# Patient Record
Sex: FEMALE | Race: OTHER | HISPANIC OR LATINO | Employment: UNEMPLOYED | URBAN - METROPOLITAN AREA
[De-identification: names, ages, dates, MRNs, and addresses within clinical notes are randomized per-mention and may not be internally consistent; named-entity substitution may affect disease eponyms.]

---

## 2019-12-08 ENCOUNTER — HOSPITAL ENCOUNTER (EMERGENCY)
Facility: HOSPITAL | Age: 29
Discharge: HOME | End: 2019-12-09
Attending: EMERGENCY MEDICINE | Admitting: EMERGENCY MEDICINE
Payer: COMMERCIAL

## 2019-12-08 ENCOUNTER — APPOINTMENT (EMERGENCY)
Dept: RADIOLOGY | Facility: HOSPITAL | Age: 29
End: 2019-12-08
Payer: COMMERCIAL

## 2019-12-08 VITALS
HEART RATE: 69 BPM | TEMPERATURE: 98.5 F | SYSTOLIC BLOOD PRESSURE: 116 MMHG | BODY MASS INDEX: 29.88 KG/M2 | WEIGHT: 175 LBS | HEIGHT: 64 IN | OXYGEN SATURATION: 100 % | RESPIRATION RATE: 18 BRPM | DIASTOLIC BLOOD PRESSURE: 56 MMHG

## 2019-12-08 DIAGNOSIS — R10.31 BILATERAL LOWER ABDOMINAL CRAMPING: ICD-10-CM

## 2019-12-08 DIAGNOSIS — N93.9 VAGINAL BLEEDING: ICD-10-CM

## 2019-12-08 DIAGNOSIS — O20.0 THREATENED MISCARRIAGE IN EARLY PREGNANCY: Primary | ICD-10-CM

## 2019-12-08 DIAGNOSIS — O23.41 UTI (URINARY TRACT INFECTION) DURING PREGNANCY, FIRST TRIMESTER: ICD-10-CM

## 2019-12-08 DIAGNOSIS — R10.32 BILATERAL LOWER ABDOMINAL CRAMPING: ICD-10-CM

## 2019-12-08 LAB
ABO GROUP BLD: NORMAL
B-HCG SERPL-ACNC: 1353 MIU/ML
BACTERIA UR QL AUTO: ABNORMAL /HPF
BILIRUB UR QL STRIP: NEGATIVE
BLD GP AB SCN SERPL QL: NEGATIVE
CLARITY UR: CLEAR
COLOR UR: YELLOW
EXT PREG TEST URINE: POSITIVE
EXT. CONTROL ED NAV: ABNORMAL
GLUCOSE UR STRIP-MCNC: NEGATIVE MG/DL
HGB UR QL STRIP.AUTO: ABNORMAL
HYALINE CASTS #/AREA URNS LPF: ABNORMAL /LPF
KETONES UR STRIP-MCNC: NEGATIVE MG/DL
LEUKOCYTE ESTERASE UR QL STRIP: ABNORMAL
MUCOUS THREADS UR QL AUTO: ABNORMAL
NITRITE UR QL STRIP: NEGATIVE
NON-SQ EPI CELLS URNS QL MICRO: ABNORMAL /HPF
PH UR STRIP.AUTO: 7 [PH] (ref 4.5–8)
PROT UR STRIP-MCNC: ABNORMAL MG/DL
RBC #/AREA URNS AUTO: ABNORMAL /HPF
RH BLD: POSITIVE
SP GR UR STRIP.AUTO: >=1.03 (ref 1–1.03)
SPECIMEN EXPIRATION DATE: NORMAL
UROBILINOGEN UR QL STRIP.AUTO: 1 E.U./DL
WBC #/AREA URNS AUTO: ABNORMAL /HPF

## 2019-12-08 PROCEDURE — 99284 EMERGENCY DEPT VISIT MOD MDM: CPT | Performed by: EMERGENCY MEDICINE

## 2019-12-08 PROCEDURE — 87086 URINE CULTURE/COLONY COUNT: CPT

## 2019-12-08 PROCEDURE — 81025 URINE PREGNANCY TEST: CPT | Performed by: EMERGENCY MEDICINE

## 2019-12-08 PROCEDURE — 86900 BLOOD TYPING SEROLOGIC ABO: CPT | Performed by: EMERGENCY MEDICINE

## 2019-12-08 PROCEDURE — 86850 RBC ANTIBODY SCREEN: CPT | Performed by: EMERGENCY MEDICINE

## 2019-12-08 PROCEDURE — 99284 EMERGENCY DEPT VISIT MOD MDM: CPT

## 2019-12-08 PROCEDURE — 36415 COLL VENOUS BLD VENIPUNCTURE: CPT | Performed by: EMERGENCY MEDICINE

## 2019-12-08 PROCEDURE — 86901 BLOOD TYPING SEROLOGIC RH(D): CPT | Performed by: EMERGENCY MEDICINE

## 2019-12-08 PROCEDURE — 76801 OB US < 14 WKS SINGLE FETUS: CPT

## 2019-12-08 PROCEDURE — 84702 CHORIONIC GONADOTROPIN TEST: CPT | Performed by: EMERGENCY MEDICINE

## 2019-12-08 PROCEDURE — 81001 URINALYSIS AUTO W/SCOPE: CPT

## 2019-12-09 ENCOUNTER — TELEPHONE (OUTPATIENT)
Dept: OBGYN CLINIC | Facility: CLINIC | Age: 29
End: 2019-12-09

## 2019-12-09 RX ORDER — CEPHALEXIN 500 MG/1
500 CAPSULE ORAL EVERY 12 HOURS SCHEDULED
Qty: 10 CAPSULE | Refills: 0 | Status: SHIPPED | OUTPATIENT
Start: 2019-12-09 | End: 2019-12-14

## 2019-12-09 NOTE — TELEPHONE ENCOUNTER
Pt  called office to make an appointment  She stated she was in the ER yesterday and diagnosed  threatened miscarriage ( less than 14 weeks)  and was to make an appt in 2 days for blood work  She has not established care anywhere yet for the pregnancy  During the conversation she stated she was feeling a lot of pressure and felt like "something was going to come out" and asked if we could see her today  She was instructed to go to the emergency room or urgent care to be seen today  Will follow up after evaluated at urgent care/ER

## 2019-12-09 NOTE — ED ATTENDING ATTESTATION
12/8/2019  I, Mg Quinones MD, saw and evaluated the patient  I have discussed the patient with the resident/non-physician practitioner and agree with the resident's/non-physician practitioner's findings, Plan of Care, and MDM as documented in the resident's/non-physician practitioner's note, except where noted  All available labs and Radiology studies were reviewed  I was present for key portions of any procedure(s) performed by the resident/non-physician practitioner and I was immediately available to provide assistance  At this point I agree with the current assessment done in the Emergency Department  I have conducted an independent evaluation of this patient a history and physical is as follows:    Patient presents with positive pregnancy test and vaginal bleeding intermittently spotting her lower abdominal cramping  Patient has approximately 4 weeks by LMP  Cervical os is closed on pelvic exam bedside ultrasound shows a gestational sac with no definitive IUP or yolk sac      Obtain serum beta HCG quant, type and Rh as patient is nonverbal at time transvaginal ultrasound dispo per formal ultrasound reading    ED Course         Critical Care Time  Procedures

## 2019-12-09 NOTE — ED PROVIDER NOTES
History  Chief Complaint   Patient presents with    Vaginal Bleeding     Pt "I just found out I was pregnant last week and I have been having a lot of cramping and bleeding  " Pt stated that it is dark red blood only noting when wiping     HPI    31yo female [id number] presents for evaluation of vaginal bleeding  Patient says she took home pregnancy and and found out last week she is pregnant, LMP 10/28/2019  Patient says yesterday she noticed bright red blood when she wiped and developed lower abdominal cramping  Patient says today blood is more of a dark brown color but has continued to have lower abdominal pain  Patient notes intermittent nausea  Patient says she has also had a few episodes of diarrhea over last couple days  Patient notes she has a history of a prior ectopic pregnancy  Patient denies any fever, chills, chest pain, shortness of breath, vomiting, hematuria, or dysuria  None       No past medical history on file  No past surgical history on file  No family history on file  I have reviewed and agree with the history as documented  Social History     Tobacco Use    Smoking status: Former Smoker   Substance Use Topics    Alcohol use: Not Currently    Drug use: Not Currently        Review of Systems   Constitutional: Negative for chills, diaphoresis, fatigue and fever  HENT: Negative for congestion, rhinorrhea and sore throat  Eyes: Negative for photophobia and visual disturbance  Respiratory: Negative for cough, chest tightness and shortness of breath  Cardiovascular: Negative for chest pain and palpitations  Gastrointestinal: Positive for abdominal pain  Negative for blood in stool, constipation, diarrhea, nausea and vomiting  Genitourinary: Positive for vaginal bleeding  Negative for dysuria, frequency and hematuria  Musculoskeletal: Negative for back pain, gait problem, myalgias, neck pain and neck stiffness  Skin: Negative for pallor and rash     Neurological: Negative for weakness, light-headedness, numbness and headaches  Hematological: Negative for adenopathy  Does not bruise/bleed easily  All other systems reviewed and are negative  Physical Exam  ED Triage Vitals [12/08/19 2139]   Temperature Pulse Respirations Blood Pressure SpO2   98 5 °F (36 9 °C) 68 18 103/68 100 %      Temp Source Heart Rate Source Patient Position - Orthostatic VS BP Location FiO2 (%)   Oral Monitor Sitting Left arm --      Pain Score       5             Orthostatic Vital Signs  Vitals:    12/08/19 2139 12/08/19 2355   BP: 103/68 116/56   Pulse: 68 69   Patient Position - Orthostatic VS: Sitting Lying       Physical Exam   Constitutional: She is oriented to person, place, and time  She appears well-developed and well-nourished  No distress  Patient is alert and oriented, appears well and nontoxic, in no acute distress   HENT:   Head: Normocephalic and atraumatic  Mouth/Throat: Oropharynx is clear and moist  No oropharyngeal exudate  Eyes: Pupils are equal, round, and reactive to light  Conjunctivae and EOM are normal    Neck: Normal range of motion  Neck supple  Cardiovascular: Normal rate, regular rhythm, normal heart sounds and intact distal pulses  Pulmonary/Chest: Effort normal and breath sounds normal  No respiratory distress  Abdominal: Soft  Bowel sounds are normal  She exhibits no distension  There is tenderness  Lower abdominal tenderness on palpation   Genitourinary: Right adnexum displays no tenderness and no fullness  Left adnexum displays no tenderness and no fullness  There is bleeding in the vagina  No tenderness in the vagina  Genitourinary Comments: Cervix closed   Musculoskeletal: Normal range of motion  Lymphadenopathy:     She has no cervical adenopathy  Neurological: She is alert and oriented to person, place, and time  No sensory deficit  She exhibits normal muscle tone  Skin: Skin is warm and dry  Capillary refill takes less than 2 seconds  No rash noted  She is not diaphoretic  No erythema  No pallor  Psychiatric: She has a normal mood and affect  Her behavior is normal  Judgment and thought content normal    Nursing note and vitals reviewed  ED Medications  Medications - No data to display    Diagnostic Studies  Results Reviewed     Procedure Component Value Units Date/Time    hCG, quantitative [[id number]]  (Abnormal) Collected:  12/08/19 2231    Lab Status:  Final result Specimen:  Blood from Arm, Left Updated:  12/08/19 2327     HCG, Quant 1,353 mIU/mL     Narrative:        Expected Ranges:     Approximate               Approximate HCG  Gestation age          Concentration ( mIU/mL)  _____________          ______________________   Nancy Govea                      HCG values  0 2-1                       5-50  1-2                           2-3                         100-5000  3-4                         500-25154  4-5                         1000-77813  5-6                         [id number]  6-8                         15514-576052  8-12                        67730-490883      Urine Microscopic [[id number]]  (Abnormal) Collected:  12/08/19 2231    Lab Status:  Final result Specimen:  Urine, Clean Catch Updated:  12/08/19 2316     RBC, UA 2-4 /hpf      WBC, UA 2-4 /hpf      Epithelial Cells Innumerable /hpf      Bacteria, UA Moderate /hpf      Hyaline Casts, UA 0-3 /lpf      MUCUS THREADS Moderate    Urine culture [[id number]] Collected:  12/08/19 2231    Lab Status:   In process Specimen:  Urine, Clean Catch Updated:  12/08/19 2238    POCT urinalysis dipstick [[id number]]  (Normal) Resulted:  12/08/19 2232    Lab Status:  Final result Specimen:  Urine Updated:  12/08/19 2232    POCT pregnancy, urine [[id number]]  (Abnormal) Resulted:  12/08/19 2231    Lab Status:  Final result Updated:  12/08/19 2232     EXT PREG TEST UR (Ref: Negative) positive     Control valid    Urine Macroscopic, POC [[id number]]  (Abnormal) Collected:  12/08/19 2231 Lab Status:  Final result Specimen:  Urine Updated:  19 2230     Color, UA Yellow     Clarity, UA Clear     pH, UA 7 0     Leukocytes, UA Trace     Nitrite, UA Negative     Protein,  (2+) mg/dl      Glucose, UA Negative mg/dl      Ketones, UA Negative mg/dl      Urobilinogen, UA 1 0 E U /dl      Bilirubin, UA Negative     Blood, UA Large     Specific Gravity, UA >=1 030    Narrative:       CLINITEK RESULT                 US OB < 14 weeks with transvaginal   Final Result by Mannie Martel MD ( [id number])   Although definitive intrauterine gestation is not identified, hypoechoic cystic structure presumed to represent early gestation is seen, which is partially encased by a hypoechoic collection felt to likely represent a subchorionic hemorrhage, which    circumscribes approximately 50% of the circumference of the cystic structure  Differential remains early IUP, spontaneous  and ectopic pregnancy  Correlate with serial quantitative BHCG  Workstation performed: [id number]               Procedures  Procedures      ED Course  ED Course as of Dec 09 0644   Mon Dec 09, 2019   0024 Discussed lab and imaging results with patient  I instructed patient to have beta quant repeated in 48 hours  I also instructed patient to call OB tomorrow and schedule follow-up for this week  Return precautions discussed  MDM  Number of Diagnoses or Management Options  Bilateral lower abdominal cramping:   Threatened miscarriage in early pregnancy:   UTI (urinary tract infection) during pregnancy, first trimester:   Vaginal bleeding:   Diagnosis management comments: 31yo female presents for evaluation of vaginal bleeding  Patient appears clinically well and is in no acute distress  Patient is hemodynamically stable  Will obtain UA, urine preg, type and screen, beta quant, and formal ultrasound          Disposition  Final diagnoses:   Threatened miscarriage in early pregnancy Vaginal bleeding   Bilateral lower abdominal cramping   UTI (urinary tract infection) during pregnancy, first trimester     Time reflects when diagnosis was documented in both MDM as applicable and the Disposition within this note     Time User Action Codes Description Comment    12/9/2019 12:21 AM Remus Ohs Add [O20 0] Threatened miscarriage in early pregnancy     12/9/2019 12:21 AM Remus Ohs Add [N93 9] Vaginal bleeding     12/9/2019 12:21 AM Remus Ohs Add [R10 31,  R10 32] Bilateral lower abdominal cramping     12/9/2019 12:23 AM Remus Ohs Add [O23 41] UTI (urinary tract infection) during pregnancy, first trimester       ED Disposition     ED Disposition Condition Date/Time Comment    Discharge Stable Mon Dec 9, 2019 12:21 AM Sancho Dennison discharge to home/self care  Follow-up Information     Follow up With Specialties Details Why [address] [facility] OB GYN Obstetrics and Gynecology Schedule an appointment as soon as possible for a visit  To establish primary care [address] [place] [id number] [phone]          Discharge Medication List as of 12/9/2019 12:23 AM      START taking these medications    Details   cephalexin (KEFLEX) 500 mg capsule Take 1 capsule (500 mg total) by mouth every 12 (twelve) hours for 5 days, Starting Mon 12/9/2019, Until Sat 12/14/2019, Print           Outpatient Discharge Orders   hCG, quantitative   Standing Status: Future Standing Exp  Date: 12/09/20       ED Provider  Attending physically available and evaluated Sancho Dennison I managed the patient along with the ED Attending      Electronically Signed by         Keith Jimenez MD  12/09/19 1429

## 2019-12-10 LAB — BACTERIA UR CULT: NORMAL

## 2019-12-11 ENCOUNTER — APPOINTMENT (OUTPATIENT)
Dept: LAB | Facility: HOSPITAL | Age: 29
End: 2019-12-11
Attending: EMERGENCY MEDICINE
Payer: COMMERCIAL

## 2019-12-11 DIAGNOSIS — O20.0 THREATENED MISCARRIAGE IN EARLY PREGNANCY: ICD-10-CM

## 2019-12-11 LAB — B-HCG SERPL-ACNC: 3847 MIU/ML

## 2019-12-11 PROCEDURE — 36415 COLL VENOUS BLD VENIPUNCTURE: CPT

## 2019-12-11 PROCEDURE — 84702 CHORIONIC GONADOTROPIN TEST: CPT

## 2020-07-09 ENCOUNTER — EVALUATION (OUTPATIENT)
Dept: PHYSICAL THERAPY | Facility: MEDICAL CENTER | Age: 30
End: 2020-07-09
Payer: COMMERCIAL

## 2020-07-09 ENCOUNTER — TRANSCRIBE ORDERS (OUTPATIENT)
Dept: PHYSICAL THERAPY | Facility: MEDICAL CENTER | Age: 30
End: 2020-07-09

## 2020-07-09 DIAGNOSIS — G44.86 CERVICOGENIC HEADACHE: ICD-10-CM

## 2020-07-09 DIAGNOSIS — G89.29 CHRONIC NECK PAIN: ICD-10-CM

## 2020-07-09 DIAGNOSIS — M54.2 CHRONIC NECK PAIN: ICD-10-CM

## 2020-07-09 DIAGNOSIS — S13.4XXA CHRONIC WHIPLASH INJURY, INITIAL ENCOUNTER: ICD-10-CM

## 2020-07-09 DIAGNOSIS — M54.2 CHRONIC NECK PAIN: Primary | ICD-10-CM

## 2020-07-09 DIAGNOSIS — G89.29 CHRONIC NECK PAIN: Primary | ICD-10-CM

## 2020-07-09 DIAGNOSIS — S13.4XXA CHRONIC WHIPLASH INJURY, INITIAL ENCOUNTER: Primary | ICD-10-CM

## 2020-07-09 PROCEDURE — 97162 PT EVAL MOD COMPLEX 30 MIN: CPT | Performed by: PHYSICAL THERAPIST

## 2020-07-09 PROCEDURE — 97010 HOT OR COLD PACKS THERAPY: CPT | Performed by: PHYSICAL THERAPIST

## 2020-07-09 PROCEDURE — 97140 MANUAL THERAPY 1/> REGIONS: CPT | Performed by: PHYSICAL THERAPIST
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
## 2020-07-15 ENCOUNTER — OFFICE VISIT (OUTPATIENT)
Dept: PHYSICAL THERAPY | Facility: MEDICAL CENTER | Age: 30
End: 2020-07-15
Payer: COMMERCIAL

## 2020-07-15 DIAGNOSIS — G44.86 CERVICOGENIC HEADACHE: ICD-10-CM

## 2020-07-15 DIAGNOSIS — M54.2 CHRONIC NECK PAIN: Primary | ICD-10-CM

## 2020-07-15 DIAGNOSIS — G89.29 CHRONIC NECK PAIN: Primary | ICD-10-CM

## 2020-07-15 DIAGNOSIS — S13.4XXA CHRONIC WHIPLASH INJURY, INITIAL ENCOUNTER: ICD-10-CM

## 2020-07-15 PROCEDURE — 97110 THERAPEUTIC EXERCISES: CPT | Performed by: PHYSICAL THERAPIST

## 2020-07-15 PROCEDURE — 97140 MANUAL THERAPY 1/> REGIONS: CPT | Performed by: PHYSICAL THERAPIST
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
## 2020-07-23 ENCOUNTER — OFFICE VISIT (OUTPATIENT)
Dept: PHYSICAL THERAPY | Facility: MEDICAL CENTER | Age: 30
End: 2020-07-23
Payer: COMMERCIAL

## 2020-07-23 DIAGNOSIS — M54.2 CHRONIC NECK PAIN: Primary | ICD-10-CM

## 2020-07-23 DIAGNOSIS — G89.29 CHRONIC NECK PAIN: Primary | ICD-10-CM

## 2020-07-23 DIAGNOSIS — G44.86 CERVICOGENIC HEADACHE: ICD-10-CM

## 2020-07-23 DIAGNOSIS — S13.4XXA CHRONIC WHIPLASH INJURY, INITIAL ENCOUNTER: ICD-10-CM

## 2020-07-23 PROCEDURE — 97110 THERAPEUTIC EXERCISES: CPT | Performed by: PHYSICAL THERAPIST

## 2020-07-23 PROCEDURE — 97140 MANUAL THERAPY 1/> REGIONS: CPT | Performed by: PHYSICAL THERAPIST
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
## 2020-07-28 ENCOUNTER — OFFICE VISIT (OUTPATIENT)
Dept: PHYSICAL THERAPY | Facility: MEDICAL CENTER | Age: 30
End: 2020-07-28
Payer: COMMERCIAL

## 2020-07-28 DIAGNOSIS — M54.2 CHRONIC NECK PAIN: Primary | ICD-10-CM

## 2020-07-28 DIAGNOSIS — G89.29 CHRONIC NECK PAIN: Primary | ICD-10-CM

## 2020-07-28 DIAGNOSIS — G44.86 CERVICOGENIC HEADACHE: ICD-10-CM

## 2020-07-28 DIAGNOSIS — S13.4XXA CHRONIC WHIPLASH INJURY, INITIAL ENCOUNTER: ICD-10-CM

## 2020-07-28 PROCEDURE — 97140 MANUAL THERAPY 1/> REGIONS: CPT | Performed by: PHYSICAL THERAPIST

## 2020-07-28 PROCEDURE — 97110 THERAPEUTIC EXERCISES: CPT | Performed by: PHYSICAL THERAPIST
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
## 2020-08-04 ENCOUNTER — OFFICE VISIT (OUTPATIENT)
Dept: PHYSICAL THERAPY | Facility: MEDICAL CENTER | Age: 30
End: 2020-08-04
Payer: COMMERCIAL

## 2020-08-04 DIAGNOSIS — M54.2 CHRONIC NECK PAIN: Primary | ICD-10-CM

## 2020-08-04 DIAGNOSIS — G44.86 CERVICOGENIC HEADACHE: ICD-10-CM

## 2020-08-04 DIAGNOSIS — G89.29 CHRONIC NECK PAIN: Primary | ICD-10-CM

## 2020-08-04 DIAGNOSIS — S13.4XXA CHRONIC WHIPLASH INJURY, INITIAL ENCOUNTER: ICD-10-CM

## 2020-08-04 PROCEDURE — 97110 THERAPEUTIC EXERCISES: CPT | Performed by: PHYSICAL THERAPIST

## 2020-08-04 PROCEDURE — 97140 MANUAL THERAPY 1/> REGIONS: CPT | Performed by: PHYSICAL THERAPIST
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
## 2020-08-06 ENCOUNTER — OFFICE VISIT (OUTPATIENT)
Dept: PHYSICAL THERAPY | Facility: MEDICAL CENTER | Age: 30
End: 2020-08-06
Payer: COMMERCIAL

## 2020-08-06 DIAGNOSIS — G44.86 CERVICOGENIC HEADACHE: ICD-10-CM

## 2020-08-06 DIAGNOSIS — G89.29 CHRONIC NECK PAIN: Primary | ICD-10-CM

## 2020-08-06 DIAGNOSIS — M54.2 CHRONIC NECK PAIN: Primary | ICD-10-CM

## 2020-08-06 DIAGNOSIS — S13.4XXA CHRONIC WHIPLASH INJURY, INITIAL ENCOUNTER: ICD-10-CM

## 2020-08-06 PROCEDURE — 97140 MANUAL THERAPY 1/> REGIONS: CPT | Performed by: PHYSICAL THERAPIST

## 2020-08-06 PROCEDURE — 97110 THERAPEUTIC EXERCISES: CPT | Performed by: PHYSICAL THERAPIST
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
## 2020-08-11 ENCOUNTER — OFFICE VISIT (OUTPATIENT)
Dept: PHYSICAL THERAPY | Facility: MEDICAL CENTER | Age: 30
End: 2020-08-11
Payer: COMMERCIAL

## 2020-08-11 DIAGNOSIS — M54.6 THORACIC SPINE PAIN: ICD-10-CM

## 2020-08-11 DIAGNOSIS — G89.29 CHRONIC MIDLINE LOW BACK PAIN WITHOUT SCIATICA: Primary | ICD-10-CM

## 2020-08-11 DIAGNOSIS — M54.50 CHRONIC MIDLINE LOW BACK PAIN WITHOUT SCIATICA: Primary | ICD-10-CM

## 2020-08-11 PROCEDURE — 97110 THERAPEUTIC EXERCISES: CPT | Performed by: PHYSICAL THERAPIST

## 2020-08-11 NOTE — PROGRESS NOTES
Daily Note     Today's date: 2020  Patient name: Megan Eugene  : 1990  MRN: [id number]  Referring provider: Yazmin Fritz MD  Dx:   Encounter Diagnosis     ICD-10-CM    1  Chronic midline low back pain without sciatica  M54 5 Ambulatory referral to Pain Management    G89 29    2  Thoracic spine pain  M54 6 Ambulatory referral to Pain Management                  Subjective: Patient arrived at clinic stating that her pain was much worse and she doesn't know why her pain has been this bad  Patient notes that for the past several days her pain is nearly unbearable and she is having severe discomfort with sleeping and waking  Objective: See treatment diary below   Pain today 8/10 at rest      Assessment: Patient in general has been doing much better however she continues to have flares of pain that are so debilitating she can't function or sleep  Patient reports that she is doing nothing different and if anything she has been more diligent with being careful with her neck and back and trying to take care of it  Symptoms do not seem to be following a movement pattern  Due to these findings a referral has been placed to pain and spine and patient again was urged to find a PCP  She was given the number for [facility]  She will be placed on hold for the time being until she is seen by pain and spine  Plan: Continue per plan of care        Precautions: none      Manuals 2020              C2-4 UPA righ             C3-4 rotational mob             STM                          Neuro Re-Ed                                                                                                        Ther Ex             Chin tucks 15            PROM cervical rotation and side bend [id number]            Shoulder abduction against wall 10x2            B Should ER against wall 10x            pec stretch             Shoulder extension 10x            scap retraction 10x                         Ther Activity Gait Training                                       Modalities

## 2020-08-13 ENCOUNTER — APPOINTMENT (OUTPATIENT)
Dept: PHYSICAL THERAPY | Facility: MEDICAL CENTER | Age: 30
End: 2020-08-13
Payer: COMMERCIAL

## 2020-09-04 ENCOUNTER — CONSULT (OUTPATIENT)
Dept: PAIN MEDICINE | Facility: MEDICAL CENTER | Age: 30
End: 2020-09-04
Payer: COMMERCIAL

## 2020-09-04 VITALS
DIASTOLIC BLOOD PRESSURE: 71 MMHG | SYSTOLIC BLOOD PRESSURE: 112 MMHG | HEIGHT: 64 IN | RESPIRATION RATE: 14 BRPM | TEMPERATURE: 97.9 F | BODY MASS INDEX: 28.68 KG/M2 | HEART RATE: 73 BPM | WEIGHT: 168 LBS

## 2020-09-04 DIAGNOSIS — M54.6 THORACIC SPINE PAIN: ICD-10-CM

## 2020-09-04 DIAGNOSIS — M79.18 MYOFASCIAL PAIN SYNDROME: ICD-10-CM

## 2020-09-04 DIAGNOSIS — G89.29 CHRONIC MIDLINE LOW BACK PAIN WITHOUT SCIATICA: ICD-10-CM
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
## 2020-09-04 DIAGNOSIS — G44.86 CERVICOGENIC HEADACHE: ICD-10-CM

## 2020-09-04 DIAGNOSIS — M54.50 CHRONIC MIDLINE LOW BACK PAIN WITHOUT SCIATICA: ICD-10-CM

## 2020-09-04 DIAGNOSIS — M54.81 OCCIPITAL NEURALGIA OF LEFT SIDE: Primary | ICD-10-CM

## 2020-09-04 PROCEDURE — 99244 OFF/OP CNSLTJ NEW/EST MOD 40: CPT | Performed by: PHYSICAL MEDICINE & REHABILITATION

## 2020-09-04 RX ORDER — GABAPENTIN 300 MG/1
300 CAPSULE ORAL
Qty: 30 CAPSULE | Refills: 1 | Status: SHIPPED | OUTPATIENT
Start: 2020-09-04 | End: 2020-12-16

## 2020-09-04 NOTE — PATIENT INSTRUCTIONS
Neck Exercises   WHAT YOU NEED TO KNOW:   Why is it important to do neck exercises? Neck exercises help reduce neck pain, and improve neck movement and strength  Neck exercises also help prevent long-term neck problems  What do I need to know about neck exercises? · Do the exercises every day,  or as often as directed by your healthcare provider  · Move slowly, gently, and smoothly  Avoid fast or jerky motions  · Stand and sit the way your healthcare provider shows you  Good posture may reduce your neck pain  Check your posture often, even when you are not doing your neck exercises  How do I perform neck exercises safely? · Exercise position:  You may sit or stand while you do neck exercises  Face forward  Your shoulders should be straight and relaxed, with a good posture  · Head tilts, forward and back:  Gently bow your head and try to touch your chin to your chest  Your healthcare provider may tell you to push on the back of your neck to help bow your head  Raise your chin back to the starting position  Tilt your head back as far as possible so you are looking up at the ceiling  Your healthcare provider may tell you to lift your chin to help tilt your head back  Return your head to the starting position  · Head tilts, side to side:  Tilt your head, bringing your ear toward your shoulder  Then tilt your head toward the other shoulder  · Head turns:  Turn your head to look over your shoulder  Tilt your chin down and try to touch it to your shoulder  Do not raise your shoulder to your chin  Face forward again  Do the same on the other side  · Head rolls:  Slowly bring your chin toward your chest  Next, roll your head to the right  Your ear should be positioned over your shoulder  Hold this position for 5 seconds  Roll your head back toward your chest and to the left into the same position  Hold for 5 seconds   Gently roll your head back and around in a clockwise [place] 3 times  Next, move your head in the reverse direction (counterclockwise) in a [place] 3 times  Do not shrug your shoulders upwards while you do this exercise  When should I contact my healthcare provider? · Your pain does not get better, or gets worse  · You have questions or concerns about your condition, care, or exercise program   CARE AGREEMENT:   You have the right to help plan your care  Learn about your health condition and how it may be treated  Discuss treatment options with your caregivers to decide what care you want to receive  You always have the right to refuse treatment  The above information is an  only  It is not intended as medical advice for individual conditions or treatments  Talk to your doctor, nurse or pharmacist before following any medical regimen to see if it is safe and effective for you  © 2017 [employer] Information is for End User's use only and may not be sold, redistributed or otherwise used for commercial purposes  All illustrations and images included in CareNotes® are the copyrighted property of A [employer]  or Andrzej Anderson
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
## 2020-09-09 ENCOUNTER — OFFICE VISIT (OUTPATIENT)
Dept: PAIN MEDICINE | Facility: MEDICAL CENTER | Age: 30
End: 2020-09-09
Payer: COMMERCIAL

## 2020-09-09 VITALS
HEIGHT: 64 IN | DIASTOLIC BLOOD PRESSURE: 74 MMHG | WEIGHT: 162 LBS | HEART RATE: 62 BPM | BODY MASS INDEX: 27.66 KG/M2 | TEMPERATURE: 98.9 F | SYSTOLIC BLOOD PRESSURE: 110 MMHG | RESPIRATION RATE: 16 BRPM

## 2020-09-09 DIAGNOSIS — G89.4 CHRONIC PAIN SYNDROME: ICD-10-CM

## 2020-09-09 DIAGNOSIS — M54.2 NECK PAIN: ICD-10-CM

## 2020-09-09 DIAGNOSIS — M79.18 MYOFASCIAL PAIN SYNDROME: ICD-10-CM

## 2020-09-09 PROCEDURE — 20552 NJX 1/MLT TRIGGER POINT 1/2: CPT | Performed by: PHYSICAL MEDICINE & REHABILITATION

## 2020-09-09 PROCEDURE — 99214 OFFICE O/P EST MOD 30 MIN: CPT | Performed by: PHYSICAL MEDICINE & REHABILITATION

## 2020-09-09 RX ORDER — METHOCARBAMOL 500 MG/1
500 TABLET, FILM COATED ORAL 3 TIMES DAILY PRN
Qty: 90 TABLET | Refills: 0 | Status: SHIPPED | OUTPATIENT
Start: 2020-09-09 | End: 2020-12-16

## 2020-09-09 RX ORDER — LIDOCAINE HYDROCHLORIDE 10 MG/ML
2 INJECTION, SOLUTION EPIDURAL; INFILTRATION; INTRACAUDAL; PERINEURAL ONCE
Status: COMPLETED | OUTPATIENT
Start: 2020-09-09 | End: 2020-09-09

## 2020-09-09 RX ADMIN — LIDOCAINE HYDROCHLORIDE 2 ML: 10 INJECTION, SOLUTION EPIDURAL; INFILTRATION; INTRACAUDAL; PERINEURAL at 10:50
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
## 2020-10-01 ENCOUNTER — TELEPHONE (OUTPATIENT)
Dept: PAIN MEDICINE | Facility: CLINIC | Age: 30
End: 2020-10-01

## 2020-10-16 ENCOUNTER — TELEPHONE (OUTPATIENT)
Dept: PAIN MEDICINE | Facility: MEDICAL CENTER | Age: 30
End: 2020-10-16

## 2020-10-27 ENCOUNTER — TELEPHONE (OUTPATIENT)
Dept: PAIN MEDICINE | Facility: MEDICAL CENTER | Age: 30
End: 2020-10-27

## 2020-10-28 DIAGNOSIS — M54.2 NECK PAIN: ICD-10-CM

## 2020-10-28 DIAGNOSIS — M54.81 BILATERAL OCCIPITAL NEURALGIA: ICD-10-CM

## 2020-10-28 DIAGNOSIS — M47.812 CERVICAL SPONDYLOSIS: Primary | ICD-10-CM

## 2020-10-28 RX ORDER — DULOXETIN HYDROCHLORIDE 30 MG/1
30 CAPSULE, DELAYED RELEASE ORAL DAILY
Qty: 30 CAPSULE | Refills: 1 | Status: SHIPPED | OUTPATIENT
Start: 2020-10-28 | End: 2020-12-16 | Stop reason: SDUPTHER

## 2020-11-19 ENCOUNTER — PROCEDURE VISIT (OUTPATIENT)
Dept: PAIN MEDICINE | Facility: MEDICAL CENTER | Age: 30
End: 2020-11-19
Payer: COMMERCIAL

## 2020-11-19 VITALS — TEMPERATURE: 97.7 F

## 2020-11-19 DIAGNOSIS — M54.81 OCCIPITAL NEURALGIA OF LEFT SIDE: Primary | ICD-10-CM

## 2020-11-19 PROCEDURE — 76942 ECHO GUIDE FOR BIOPSY: CPT | Performed by: PHYSICAL MEDICINE & REHABILITATION

## 2020-11-19 PROCEDURE — 64405 NJX AA&/STRD GR OCPL NRV: CPT | Performed by: PHYSICAL MEDICINE & REHABILITATION

## 2020-11-19 RX ORDER — METHYLPREDNISOLONE ACETATE 40 MG/ML
40 INJECTION, SUSPENSION INTRA-ARTICULAR; INTRALESIONAL; INTRAMUSCULAR; SOFT TISSUE ONCE
Status: COMPLETED | OUTPATIENT
Start: 2020-11-19 | End: 2020-11-19

## 2020-11-19 RX ORDER — BUPIVACAINE HYDROCHLORIDE 2.5 MG/ML
10 INJECTION, SOLUTION EPIDURAL; INFILTRATION; INTRACAUDAL ONCE
Status: COMPLETED | OUTPATIENT
Start: 2020-11-19 | End: 2020-11-19

## 2020-11-19 RX ADMIN — METHYLPREDNISOLONE ACETATE 40 MG: 40 INJECTION, SUSPENSION INTRA-ARTICULAR; INTRALESIONAL; INTRAMUSCULAR; SOFT TISSUE at 11:13

## 2020-11-19 RX ADMIN — BUPIVACAINE HYDROCHLORIDE 10 ML: 2.5 INJECTION, SOLUTION EPIDURAL; INFILTRATION; INTRACAUDAL at 11:13

## 2020-11-27 ENCOUNTER — TELEPHONE (OUTPATIENT)
Dept: PAIN MEDICINE | Facility: CLINIC | Age: 30
End: 2020-11-27

## 2020-12-13 ENCOUNTER — TELEPHONE (OUTPATIENT)
Dept: OTHER | Facility: OTHER | Age: 30
End: 2020-12-13

## 2020-12-16 ENCOUNTER — OFFICE VISIT (OUTPATIENT)
Dept: PAIN MEDICINE | Facility: MEDICAL CENTER | Age: 30
End: 2020-12-16
Payer: COMMERCIAL

## 2020-12-16 VITALS
BODY MASS INDEX: 29.02 KG/M2 | HEIGHT: 64 IN | SYSTOLIC BLOOD PRESSURE: 105 MMHG | HEART RATE: 58 BPM | TEMPERATURE: 96.8 F | DIASTOLIC BLOOD PRESSURE: 66 MMHG | WEIGHT: 170 LBS

## 2020-12-16 DIAGNOSIS — G89.4 CHRONIC PAIN SYNDROME: ICD-10-CM

## 2020-12-16 DIAGNOSIS — M54.2 NECK PAIN: ICD-10-CM

## 2020-12-16 DIAGNOSIS — M54.81 BILATERAL OCCIPITAL NEURALGIA: ICD-10-CM

## 2020-12-16 DIAGNOSIS — M79.18 MYOFASCIAL PAIN SYNDROME: ICD-10-CM

## 2020-12-16 DIAGNOSIS — M47.812 CERVICAL SPONDYLOSIS: ICD-10-CM

## 2020-12-16 PROCEDURE — 99214 OFFICE O/P EST MOD 30 MIN: CPT | Performed by: NURSE PRACTITIONER

## 2020-12-16 RX ORDER — DULOXETINE 40 MG/1
40 CAPSULE, DELAYED RELEASE ORAL DAILY
Qty: 30 CAPSULE | Refills: 2 | Status: SHIPPED | OUTPATIENT
Start: 2020-12-16 | End: 2020-12-17

## 2020-12-16 RX ORDER — TIZANIDINE 2 MG/1
2 TABLET ORAL 2 TIMES DAILY
Qty: 60 TABLET | Refills: 2 | Status: SHIPPED | OUTPATIENT
Start: 2020-12-16 | End: 2021-03-25

## 2020-12-17 ENCOUNTER — TELEPHONE (OUTPATIENT)
Dept: PAIN MEDICINE | Facility: CLINIC | Age: 30
End: 2020-12-17

## 2020-12-17 DIAGNOSIS — M79.18 MYOFASCIAL PAIN SYNDROME: Primary | ICD-10-CM

## 2020-12-17 DIAGNOSIS — M54.81 BILATERAL OCCIPITAL NEURALGIA: ICD-10-CM

## 2020-12-17 DIAGNOSIS — M54.2 NECK PAIN: ICD-10-CM

## 2020-12-17 DIAGNOSIS — M47.812 CERVICAL SPONDYLOSIS: ICD-10-CM

## 2020-12-17 RX ORDER — DULOXETIN HYDROCHLORIDE 20 MG/1
CAPSULE, DELAYED RELEASE ORAL
Qty: 60 CAPSULE | Refills: 1 | Status: SHIPPED | OUTPATIENT
Start: 2020-12-17 | End: 2021-03-25

## 2020-12-28 ENCOUNTER — TELEPHONE (OUTPATIENT)
Dept: PAIN MEDICINE | Facility: CLINIC | Age: 30
End: 2020-12-28

## 2021-01-28 ENCOUNTER — OFFICE VISIT (OUTPATIENT)
Dept: PAIN MEDICINE | Facility: MEDICAL CENTER | Age: 31
End: 2021-01-28
Payer: COMMERCIAL

## 2021-01-28 VITALS — TEMPERATURE: 97.4 F | WEIGHT: 170 LBS | BODY MASS INDEX: 29.02 KG/M2 | HEIGHT: 64 IN

## 2021-01-28 DIAGNOSIS — M54.16 LUMBAR RADICULOPATHY: Primary | ICD-10-CM

## 2021-01-28 PROCEDURE — 20552 NJX 1/MLT TRIGGER POINT 1/2: CPT | Performed by: PHYSICAL MEDICINE & REHABILITATION

## 2021-01-28 PROCEDURE — 99214 OFFICE O/P EST MOD 30 MIN: CPT | Performed by: PHYSICAL MEDICINE & REHABILITATION

## 2021-01-28 NOTE — PATIENT INSTRUCTIONS
Trigger Point Pain   WHAT YOU NEED TO KNOW:   A trigger point is a tight, tender lump in your muscle  Trigger points may form after muscle injury, overuse, stress, or tension  Muscle stress may be from poor posture or an awkward sleep position  Emotional stress can make you tense certain muscles, such as in your neck  DISCHARGE INSTRUCTIONS:   Call your doctor or pain specialist if:   · You have new or worsening pain around the trigger point  · You have questions or concerns about your condition or care  Medicines: You may need any of the following:  · NSAIDs , such as ibuprofen, help decrease swelling, pain, and fever  NSAIDs can cause stomach bleeding or kidney problems in certain people  If you take blood thinner medicine, always ask your healthcare provider if NSAIDs are safe for you  Always read the medicine label and follow directions  · Prescription pain medicine  may be given  Ask your healthcare provider how to take this medicine safely  Some prescription pain medicines contain acetaminophen  Do not take other medicines that contain acetaminophen without talking to your healthcare provider  Too much acetaminophen may cause liver damage  Prescription pain medicine may cause constipation  Ask your healthcare provider how to prevent or treat constipation  · Medicine  may be given to help your muscles relax  · Take your medicine as directed  Contact your healthcare provider if you think your medicine is not helping or if you have side effects  Tell him or her if you are allergic to any medicine  Keep a list of the medicines, vitamins, and herbs you take  Include the amounts, and when and why you take them  Bring the list or the pill bottles to follow-up visits  Carry your medicine list with you in case of an emergency  Manage trigger point pain:   · Do regular stretches of the trigger point muscle  Place gentle pressure on the trigger point, and then stretch the muscle   Ask your healthcare provider for more information about how to stretch and apply pressure  · Apply heat to trigger point sites  Heat can help relax muscles and relieve trigger point pain  Use a heat pack or a heating pad set on low  Apply heat for 15 minutes every hour, or as directed  Follow up with your doctor or pain specialist as directed:  Write down your questions so you remember to ask them during your visits  © Copyright [address] Information is for End User's use only and may not be sold, redistributed or otherwise used for commercial purposes  All illustrations and images included in CareNotes® are the copyrighted property of [employer]  or [address] [place] [address]  The above information is an  only  It is not intended as medical advice for individual conditions or treatments  Talk to your doctor, nurse or pharmacist before following any medical regimen to see if it is safe and effective for you

## 2021-01-28 NOTE — PROGRESS NOTES
Assessment:  1  Lumbar radiculopathy        Plan:  Indication:  Muscular pain  Preprocedure diagnosis:  1  Myofascial pain syndrome  Postprocedure diagnosis:  1  Myofascial pain syndrome    Procedure: Bilateral trap muscle trigger point injection(s)  After discussing the risks, benefits, and alternatives to the procedure, the patient expressed understanding and wished to proceed  The patient was brought to the procedure suite and placed in the  seated position  A procedural pause was conducted to verify:  correct patient identity, procedure to be performed and as applicable, correct side and site, correct patient position, and availability of implants, special equipment or special requirements  A simple surgical tray was used  Prior to the procedure, the  Bilateral traps were examined and marked  Following this, the area was prepared with alcohol pads then re-examined  Thereafter, a 1 5 inch 25 gauge needle was inserted, negative aspiration for blood, and then a mixture of  4 mL of 1% lidocaine with 1 mL of 40 milligrams/milliliter Depo-Medrol was injected into the  Targeted trigger points  Following the injection, the needle was withdrawn  The patient tolerated the procedure well and there were no apparent complications  No significant bleeding post-injection, bandaids applied  After an appropriate amount of observation, the patient was dismissed from the clinic in good condition under their own power  History of Present Illness: The patient is a 31 y o  female presents to [facility] for follow-up and re-evaluation regarding neck, bilateral midback pain  Currently reporting 8/10 pain that is described as a constant throbbing, shooting, pins and needles, sharp, dull and aching sensation that is most present in the morning in the evening  Presents today for follow-up, re-evaluation and trigger point injections to the bilateral traps      I have personally reviewed and/or updated the patient's past medical history, past surgical history, family history, social history, current medications, allergies, and vital signs today  Review of Systems:    Review of Systems   Respiratory: Negative for shortness of breath  Cardiovascular: Negative for chest pain  Gastrointestinal: Negative for constipation, diarrhea, nausea and vomiting  Musculoskeletal: Negative for arthralgias, gait problem, joint swelling and myalgias  Skin: Negative for rash  Neurological: Negative for dizziness, seizures and weakness  All other systems reviewed and are negative  Past Medical History:   Diagnosis Date    Anxiety     Chronic pain disorder     Neck pain     Osteoarthritis        History reviewed  No pertinent surgical history  Family History   Problem Relation Age of Onset    No Known Problems Mother     No Known Problems Father        Social History     Occupational History    Not on file   Tobacco Use    Smoking status: Former Smoker    Smokeless tobacco: Never Used   Substance and Sexual Activity    Alcohol use: Not Currently    Drug use: Not Currently    Sexual activity: Yes     Partners: Male         Current Outpatient Medications:     DULoxetine (CYMBALTA) 20 mg capsule, Take 2 caps PO daily, Disp: 60 capsule, Rfl: 1    tiZANidine (ZANAFLEX) 2 mg tablet, Take 1 tablet (2 mg total) by mouth 2 (two) times a day, Disp: 60 tablet, Rfl: 2    No Known Allergies    Physical Exam:    Temp (!) 97 4 °F (36 3 °C)   Ht 5' 4" (1 626 m)   Wt 77 1 kg (170 lb)   BMI 29 18 kg/m²     Constitutional:normal, well developed, well nourished, alert, in no distress and non-toxic and no overt pain behavior   and underweight  Eyes:anicteric  HEENT:grossly intact  Neck:supple, symmetric, trachea midline and no masses   Pulmonary:even and unlabored  Cardiovascular:No edema or pitting edema present  Skin:Normal without rashes or lesions and well hydrated  Psychiatric:Mood and affect appropriate  Neurologic:Cranial Nerves II-XII grossly intact  Musculoskeletal:normal  Gait, tenderness to palpation bilateral traps, decreased active and passive range of motion with cervical flexion extension limited by pain      Imaging  MRI lumbar spine without contrast    (Results Pending)         Orders Placed This Encounter   Procedures    MRI lumbar spine without contrast

## 2021-01-29 RX ORDER — LIDOCAINE HYDROCHLORIDE 10 MG/ML
5 INJECTION, SOLUTION INFILTRATION; PERINEURAL ONCE
Status: CANCELLED | OUTPATIENT
Start: 2021-01-29 | End: 2021-01-29

## 2021-01-29 RX ORDER — METHYLPREDNISOLONE ACETATE 40 MG/ML
40 INJECTION, SUSPENSION INTRA-ARTICULAR; INTRALESIONAL; INTRAMUSCULAR; SOFT TISSUE ONCE
Status: CANCELLED | OUTPATIENT
Start: 2021-01-29 | End: 2021-01-29

## 2021-02-02 DIAGNOSIS — M54.40 BILATERAL LOW BACK PAIN WITH SCIATICA, SCIATICA LATERALITY UNSPECIFIED, UNSPECIFIED CHRONICITY: ICD-10-CM

## 2021-02-02 DIAGNOSIS — M54.40 LOW BACK PAIN WITH SCIATICA, SCIATICA LATERALITY UNSPECIFIED, UNSPECIFIED BACK PAIN LATERALITY, UNSPECIFIED CHRONICITY: Primary | ICD-10-CM

## 2021-02-12 NOTE — PROGRESS NOTES
Thank you for the catch   The visit was for a trigger point injection and should be but is a [id number]   Thank you

## 2021-02-18 ENCOUNTER — EVALUATION (OUTPATIENT)
Dept: PHYSICAL THERAPY | Facility: MEDICAL CENTER | Age: 31
End: 2021-02-18
Payer: COMMERCIAL

## 2021-02-18 DIAGNOSIS — M54.50 LUMBAR PAIN: Primary | ICD-10-CM

## 2021-02-18 PROCEDURE — 97112 NEUROMUSCULAR REEDUCATION: CPT | Performed by: PHYSICAL MEDICINE & REHABILITATION

## 2021-02-18 PROCEDURE — 97161 PT EVAL LOW COMPLEX 20 MIN: CPT | Performed by: PHYSICAL MEDICINE & REHABILITATION
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
## 2021-02-23 ENCOUNTER — APPOINTMENT (OUTPATIENT)
Dept: PHYSICAL THERAPY | Facility: MEDICAL CENTER | Age: 31
End: 2021-02-23
Payer: COMMERCIAL

## 2021-02-25 ENCOUNTER — OFFICE VISIT (OUTPATIENT)
Dept: PHYSICAL THERAPY | Facility: MEDICAL CENTER | Age: 31
End: 2021-02-25
Payer: COMMERCIAL

## 2021-02-25 ENCOUNTER — PROCEDURE VISIT (OUTPATIENT)
Dept: PAIN MEDICINE | Facility: MEDICAL CENTER | Age: 31
End: 2021-02-25
Payer: COMMERCIAL

## 2021-02-25 DIAGNOSIS — M54.50 LUMBAR PAIN: Primary | ICD-10-CM

## 2021-02-25 DIAGNOSIS — M79.18 MYOFASCIAL PAIN SYNDROME: Primary | ICD-10-CM

## 2021-02-25 PROCEDURE — 76942 ECHO GUIDE FOR BIOPSY: CPT | Performed by: PHYSICAL MEDICINE & REHABILITATION

## 2021-02-25 PROCEDURE — 20552 NJX 1/MLT TRIGGER POINT 1/2: CPT | Performed by: PHYSICAL MEDICINE & REHABILITATION

## 2021-02-25 PROCEDURE — 97010 HOT OR COLD PACKS THERAPY: CPT | Performed by: PHYSICAL THERAPIST

## 2021-02-25 PROCEDURE — 97112 NEUROMUSCULAR REEDUCATION: CPT | Performed by: PHYSICAL THERAPIST

## 2021-02-25 RX ORDER — METHYLPREDNISOLONE ACETATE 40 MG/ML
40 INJECTION, SUSPENSION INTRA-ARTICULAR; INTRALESIONAL; INTRAMUSCULAR; SOFT TISSUE ONCE
Status: CANCELLED | OUTPATIENT
Start: 2021-02-25

## 2021-02-25 RX ORDER — BUPIVACAINE HYDROCHLORIDE 2.5 MG/ML
10 INJECTION, SOLUTION EPIDURAL; INFILTRATION; INTRACAUDAL ONCE
Status: COMPLETED | OUTPATIENT
Start: 2021-02-25 | End: 2021-02-25

## 2021-02-25 RX ORDER — METHYLPREDNISOLONE ACETATE 40 MG/ML
40 INJECTION, SUSPENSION INTRA-ARTICULAR; INTRALESIONAL; INTRAMUSCULAR; SOFT TISSUE ONCE
Status: COMPLETED | OUTPATIENT
Start: 2021-02-25 | End: 2021-02-25

## 2021-02-25 RX ADMIN — BUPIVACAINE HYDROCHLORIDE 10 ML: 2.5 INJECTION, SOLUTION EPIDURAL; INFILTRATION; INTRACAUDAL at 11:50

## 2021-02-25 RX ADMIN — METHYLPREDNISOLONE ACETATE 40 MG: 40 INJECTION, SUSPENSION INTRA-ARTICULAR; INTRALESIONAL; INTRAMUSCULAR; SOFT TISSUE at 11:51

## 2021-02-25 NOTE — PROGRESS NOTES
Indication:  Muscular pain  Preprocedure diagnosis:  1  Myofascial pain syndrome  Postprocedure diagnosis:  1  Myofascial pain syndrome    Procedure:  Ultrasound-guided  Bilateral thoracic paraspinal trigger point injection(s)  After discussing the risks, benefits, and alternatives to the procedure, the patient expressed understanding and wished to proceed  The patient was brought to the procedure suite and placed in the  prone position  A procedural pause was conducted to verify:  correct patient identity, procedure to be performed and as applicable, correct side and site, correct patient position, and availability of implants, special equipment or special requirements  A simple surgical tray was used  Prior to the procedure, the  Thoracic paraspinals were examined with a 12 MHz linear transducer to visualize the  Trigger points and determine the optimal needle path  Following this, the area was prepared with a ChloraPrep scrub, then re-examined using the same transducer, a sterile ultrasound transducer cover, and sterile ultrasound transducer gel  Thereafter, using ultrasound guidance, a 2 5-inch 25-gauge needle was advanced into the  Bilateral thoracic paraspinal trigger points  After visualization of the tip and negative aspiration for blood, a mixture of  4 mL of 0 25% bupivacaine with 1 mL of 40 milligrams/milliliter Depo-Medrol was injected into the targeted thoracic paraspinal trigger point  Following the injection, the needle was withdrawn  The patient tolerated the procedure well and there were no apparent complications  After an appropriate amount of observation, the patient was dismissed from the clinic in good condition under their own power

## 2021-02-25 NOTE — PROGRESS NOTES
Daily Note     Today's date: 2021  Patient name: Mesfin Crawley  : 1990  MRN: [id number]  Referring provider: Priyanka Mcclelland DO  Dx:   Encounter Diagnosis     ICD-10-CM    1  Lumbar pain  M54 5                   Subjective: Patient states she had an injection this morning so she is sore in her lower back  Objective: See treatment diary below      Assessment: Initiated treatment with MHP in prone which patient was very comfortable with and had some relief with the heat  Followed up with gentle abdominal bracing with progressions  Patient challenged with abdominal bracing  Patient instructed to hold on prone press ups until at least 24 hours from injections  Monitor response nv  Plan: Continue per plan of care  Progress treatment as tolerated         Precautions: none      Manuals 2021                                                               Neuro Re-Ed             TA brace  5"x20           TA brace with add lalitha  5"x20           TA brace with supine clam  5"x20 blue                                                               HEP and Return Demo 10'            Ther Ex                                                                                                                     Ther Activity                                       Gait Training                                       Modalities             MHP prone  Pre tx x15'

## 2021-03-01 ENCOUNTER — TELEPHONE (OUTPATIENT)
Dept: PAIN MEDICINE | Facility: CLINIC | Age: 31
End: 2021-03-01

## 2021-03-01 DIAGNOSIS — G89.29 CHRONIC BILATERAL LOW BACK PAIN, UNSPECIFIED WHETHER SCIATICA PRESENT: Primary | ICD-10-CM

## 2021-03-01 DIAGNOSIS — M79.18 MYOFASCIAL PAIN SYNDROME: ICD-10-CM

## 2021-03-01 DIAGNOSIS — M54.50 CHRONIC BILATERAL LOW BACK PAIN, UNSPECIFIED WHETHER SCIATICA PRESENT: Primary | ICD-10-CM

## 2021-03-01 RX ORDER — NAPROXEN 500 MG/1
500 TABLET ORAL 2 TIMES DAILY WITH MEALS
Qty: 60 TABLET | Refills: 1 | Status: SHIPPED | OUTPATIENT
Start: 2021-03-01 | End: 2021-07-28

## 2021-03-01 NOTE — TELEPHONE ENCOUNTER
Pt called in to speak with the doctor or a nurse in regard to her pain level  Please be advise thank you        Please call patient back @  [phone]

## 2021-03-01 NOTE — TELEPHONE ENCOUNTER
S/w pt, states she is having significant pain in mid - lower back  Pt states she is unable to describe pain, RN provided several descriptors, pt states still unable to provide description  Pt said her neck and shoulders are feeling okay, not as painful as lower back  Pt said she has been using heat and taking tylenol without relief  Pt said she is no longer taking Cymbalta or tizanidine because she didn't think they were helpful  Pt requesting further recommendations  Please advise, thank you  Pt had USG BL thoracic trigger point injections on 2/25, pt aware can take 2 weeks for full effect

## 2021-03-01 NOTE — TELEPHONE ENCOUNTER
Naprosyn 500 mg twice a day sent to pharmacy   Please advise patient   Patient needs to get the MRI lumbar spine completed to better identify reasons for continued back pain   Thank you

## 2021-03-02 ENCOUNTER — OFFICE VISIT (OUTPATIENT)
Dept: PHYSICAL THERAPY | Facility: MEDICAL CENTER | Age: 31
End: 2021-03-02
Payer: COMMERCIAL

## 2021-03-02 DIAGNOSIS — M54.50 LUMBAR PAIN: Primary | ICD-10-CM

## 2021-03-02 PROCEDURE — 97112 NEUROMUSCULAR REEDUCATION: CPT

## 2021-03-02 PROCEDURE — 97110 THERAPEUTIC EXERCISES: CPT

## 2021-03-02 PROCEDURE — 97140 MANUAL THERAPY 1/> REGIONS: CPT

## 2021-03-02 NOTE — TELEPHONE ENCOUNTER
KEV    S/w pt who states that she just likes to know dosage, instructions, class of med before she takes it  Pt was advised of Naprosyn instructions and dosage as ordered by Dr Rose Lucio  Pt was advised that Naprosyn is an NSAID, works to reduce inflammation subsequently reducing pain symptoms  Pt verbalized understanding and was appreciative  Pt states that she received a letter from her insurance company that her lumbar MRI was approved  Pt asked for central scheduling number so she can set up her MRI again  RN provided central scheduling number to pt  Pt appreciative

## 2021-03-02 NOTE — TELEPHONE ENCOUNTER
patient called in & made aware of the medication at the pharmacy for her , but she has questions about what it will do for her pain   Thank you      -Naprosyn is the medication in question      [phone]

## 2021-03-02 NOTE — PROGRESS NOTES
Daily Note     Today's date: 3/2/2021  Patient name: Melvin Ortega  : 1990  MRN: [id number]  Referring provider: Isaias Sykes DO  Dx:   Encounter Diagnosis     ICD-10-CM    1  Lumbar pain  M54 5                   Subjective: Pt reports to PT with continued back pain and rates it a 6/10  Pt states that she has not had any relief w/the injection last week and could not perform any activities over the weekend due to pain, therefore, she just rested on the couch most of the weekend  Pt states that the pain is in a larger area (her whole thoracic) since the injection  Pt is unable to describe the type of pain she has in general, but states that she experiences sharp pains when she occasionally reaches for an object  Pt scheduled to have an MRI this   Objective: See treatment diary below      Assessment: Tolerated treatment fair    Patient continued w/back pain  Began tx with MHP in prone, followed by prone press ups  Pt 's pain did not change any with press ups  IASTM performed by JR NICOL but still no change in pain  CANDELARIA barrera then performed and experienced a little relief from her tightness  Plan: Continue per plan of care        Precautions: none      Manuals 2021 2/25 3/2          SONU barrera JR                                    Neuro Re-Ed             TA brace  5"x20 NP          TA brace with add lalihta  5"x20 NP          TA brace with supine clam  5"x20 blue NP          Prone press ups   2x10                                                 HEP and Return Demo 10'            Ther Ex                                                                                                                     Ther Activity                                       Gait Training                                       Modalities             MHP prone  Pre tx x15' Pre tx  x15'

## 2021-03-04 ENCOUNTER — APPOINTMENT (OUTPATIENT)
Dept: PHYSICAL THERAPY | Facility: MEDICAL CENTER | Age: 31
End: 2021-03-04
Payer: COMMERCIAL

## 2021-03-04 ENCOUNTER — TELEPHONE (OUTPATIENT)
Dept: PAIN MEDICINE | Facility: CLINIC | Age: 31
End: 2021-03-04

## 2021-03-05 ENCOUNTER — HOSPITAL ENCOUNTER (OUTPATIENT)
Dept: MRI IMAGING | Facility: HOSPITAL | Age: 31
Discharge: HOME | End: 2021-03-05
Attending: PHYSICAL MEDICINE & REHABILITATION
Payer: COMMERCIAL

## 2021-03-05 DIAGNOSIS — M54.16 LUMBAR RADICULOPATHY: ICD-10-CM

## 2021-03-05 PROCEDURE — G1004 CDSM NDSC: HCPCS

## 2021-03-05 PROCEDURE — 72148 MRI LUMBAR SPINE W/O DYE: CPT

## 2021-03-08 ENCOUNTER — TELEPHONE (OUTPATIENT)
Dept: PAIN MEDICINE | Facility: CLINIC | Age: 31
End: 2021-03-08

## 2021-03-09 ENCOUNTER — APPOINTMENT (OUTPATIENT)
Dept: PHYSICAL THERAPY | Facility: MEDICAL CENTER | Age: 31
End: 2021-03-09
Payer: COMMERCIAL

## 2021-03-10 NOTE — TELEPHONE ENCOUNTER
Lumbar MRI results were completely normal   no evidence of stenosis, disc herniations or bulges  Thank you

## 2021-03-11 ENCOUNTER — OFFICE VISIT (OUTPATIENT)
Dept: PHYSICAL THERAPY | Facility: MEDICAL CENTER | Age: 31
End: 2021-03-11
Payer: COMMERCIAL

## 2021-03-11 ENCOUNTER — APPOINTMENT (OUTPATIENT)
Dept: PHYSICAL THERAPY | Facility: MEDICAL CENTER | Age: 31
End: 2021-03-11
Payer: COMMERCIAL

## 2021-03-11 DIAGNOSIS — M54.50 LUMBAR PAIN: Primary | ICD-10-CM

## 2021-03-11 PROCEDURE — 97140 MANUAL THERAPY 1/> REGIONS: CPT | Performed by: PHYSICAL MEDICINE & REHABILITATION

## 2021-03-11 PROCEDURE — 97112 NEUROMUSCULAR REEDUCATION: CPT | Performed by: PHYSICAL MEDICINE & REHABILITATION

## 2021-03-11 PROCEDURE — 97110 THERAPEUTIC EXERCISES: CPT | Performed by: PHYSICAL MEDICINE & REHABILITATION

## 2021-03-11 NOTE — PROGRESS NOTES
Daily Note     Today's date: 3/11/2021  Patient name: Itzel Longoria  : 1990  MRN: [id number]  Referring provider: Carito Mendez DO  Dx:   Encounter Diagnosis     ICD-10-CM    1  Lumbar pain  M54 5                   Subjective: Eleni Fani reported "I got the results of the MRI I guess it is good that nothing seemed bad on it but then what is wrong "      Objective: See treatment diary below      Assessment: Tolerated treatment well  Patient would benefit from continued PT  Nature of MRIs and nature of back pain education was given during today's session  Eleni Fani reported centralization of symptoms following prone press ups  Following the session she reported a decrease in intensity of pain and a decrease in area of pain  Plan: Continue per plan of care        Precautions: none      Manuals 2021 2/25 3/2 3/11/2021         SONU barrera JR, JR                                   Neuro Re-Ed             TA brace  5"x20 NP 5"x20          TA brace with add lalitha  5"x20 NP 5"x20         TA brace with supine clam  5"x20 blue NP 5"x20         Prone press ups   2x10 3x10                                                HEP and Return Demo 10'            Ther Ex             LTR    3x10                                                                                                    Ther Activity                                       Gait Training                                       Modalities             MHP prone  Pre tx x15' Pre tx  x15'

## 2021-03-16 ENCOUNTER — OFFICE VISIT (OUTPATIENT)
Dept: PHYSICAL THERAPY | Facility: MEDICAL CENTER | Age: 31
End: 2021-03-16
Payer: COMMERCIAL

## 2021-03-16 DIAGNOSIS — M54.50 LUMBAR PAIN: Primary | ICD-10-CM

## 2021-03-16 PROCEDURE — 97110 THERAPEUTIC EXERCISES: CPT | Performed by: PHYSICAL MEDICINE & REHABILITATION

## 2021-03-16 PROCEDURE — 97140 MANUAL THERAPY 1/> REGIONS: CPT | Performed by: PHYSICAL MEDICINE & REHABILITATION

## 2021-03-16 PROCEDURE — 97112 NEUROMUSCULAR REEDUCATION: CPT | Performed by: PHYSICAL MEDICINE & REHABILITATION

## 2021-03-16 NOTE — PROGRESS NOTES
Daily Note     Today's date: 3/16/2021  Patient name: Nohemy Fernandez  : 1990  MRN: [id number]  Referring provider: Riddhi Bender DO  Dx:   Encounter Diagnosis     ICD-10-CM    1  Lumbar pain  M54 5                   Subjective: Alisia White reported "I am doing a little bit better "      Objective: See treatment diary below      Assessment: Tolerated treatment well  Patient would benefit from continued PT  Alisia White was able to add physioball press to her therapy program which shows progress towards her goals  She reported a decrease in pain following manual therapy as well as prone press ups  Next session consider over pressure  Plan: Continue per plan of care        Precautions: none      Manuals 2021 2/25 3/2 3/11/2021 3/16/2021        IASSHARITA EDGE JR JR        PA holly EDGE JR JR                                  Neuro Re-Ed             TA brace  5"x20 NP 5"x20  5"x20        TA brace with add lalitha  5"x20 NP 5"x20 5"x20        TA brace with supine clam  5"x20 blue NP 5"x20 5"x20        Prone press ups   2x10 3x10 3x10        Physioball Press     OR 3x10                                  HEP and Return Demo 10'            Ther Ex             LTR    3x10                                                                                                    Ther Activity                                       Gait Training                                       Modalities             MHP prone  Pre tx x15' Pre tx  x15'

## 2021-03-18 ENCOUNTER — APPOINTMENT (OUTPATIENT)
Dept: PHYSICAL THERAPY | Facility: MEDICAL CENTER | Age: 31
End: 2021-03-18
Payer: COMMERCIAL

## 2021-03-25 ENCOUNTER — OFFICE VISIT (OUTPATIENT)
Dept: PAIN MEDICINE | Facility: MEDICAL CENTER | Age: 31
End: 2021-03-25
Payer: COMMERCIAL

## 2021-03-25 VITALS
HEART RATE: 65 BPM | SYSTOLIC BLOOD PRESSURE: 121 MMHG | BODY MASS INDEX: 30.22 KG/M2 | TEMPERATURE: 97.6 F | HEIGHT: 64 IN | WEIGHT: 177 LBS | DIASTOLIC BLOOD PRESSURE: 81 MMHG

## 2021-03-25 DIAGNOSIS — M54.9 MID BACK PAIN: ICD-10-CM

## 2021-03-25 DIAGNOSIS — M79.18 MYOFASCIAL PAIN SYNDROME: Primary | ICD-10-CM

## 2021-03-25 PROCEDURE — 99214 OFFICE O/P EST MOD 30 MIN: CPT | Performed by: PHYSICAL MEDICINE & REHABILITATION

## 2021-03-25 NOTE — PROGRESS NOTES
Assessment  1  Myofascial pain syndrome    2  Mid back pain        Plan  Ms Zulma Moise   Is a pleasant 58-year-old female who presents for follow-up and re-evaluation regarding myofascial pain syndrome of the bilateral thoracolumbar paraspinals  We have previously performed trigger point injections which she reports made the pain worse  Muscle relaxer and membrane stabilizing agents have provided minimal relief  She is now currently taking Naprosyn 500 mg twice a day which again is providing minimal relief  At this time we will refer to Sports Medicine for further workup and there consideration  Structurally her thoracolumbar spine is not demonstrating any significant abnormalities and would refer to Dr Kristyn Sanches for his consideration   I have spent >20 minutes with Patient  today in which greater than 50% of this time was spent in counseling/coordination of care regarding Diagnostic results, Prognosis, Risks and benefits of tx options and Intructions for management  My impressions and treatment recommendations were discussed in detail with the patient who verbalized understanding and had no further questions  Discharge instructions were provided  I personally saw and examined the patient and I agree with the above discussed plan of care  Orders Placed This Encounter   Procedures    Ambulatory referral to Sports Medicine     Standing Status:   Future     Standing Expiration Date:   3/25/2022     Referral Priority:   Routine     Referral Type:   Consult - AMB     Referral Reason:   Specialty Services Required     Referred to Provider:   Afsaneh Cartwright III, DO     Requested Specialty:   Sports Medicine     Number of Visits Requested:   1     Expiration Date:   3/25/2022     No orders of the defined types were placed in this encounter        History of Present Illness    Shefali Meraz is a 31 y o  female  [facility]-up and re-evaluation regarding mid back pain currently rated 7/10 and interfering with daily activities  Pain is described as a constant throbbing, aching, shooting pain  Presents for follow-up and re-evaluation  I have personally reviewed and/or updated the patient's past medical history, past surgical history, family history, social history, current medications, allergies, and vital signs today  Review of Systems   Respiratory: Negative for shortness of breath  Cardiovascular: Negative for chest pain  Gastrointestinal: Negative for constipation, diarrhea, nausea and vomiting  Musculoskeletal: Positive for gait problem (shoulder pain  ), myalgias and neck pain  Negative for arthralgias and joint swelling  Skin: Negative for rash  Neurological: Negative for dizziness, seizures and weakness  All other systems reviewed and are negative  Patient Active Problem List   Diagnosis    Occipital neuralgia of left side    Myofascial pain syndrome       Past Medical History:   Diagnosis Date    Anxiety     Chronic pain disorder     Neck pain     Osteoarthritis        History reviewed  No pertinent surgical history      Family History   Problem Relation Age of Onset    No Known Problems Mother     No Known Problems Father        Social History     Occupational History    Not on file   Tobacco Use    Smoking status: Former Smoker    Smokeless tobacco: Never Used   Substance and Sexual Activity    Alcohol use: Not Currently    Drug use: Not Currently    Sexual activity: Yes     Partners: Male       Current Outpatient Medications on File Prior to Visit   Medication Sig    naproxen (NAPROSYN) 500 mg tablet Take 1 tablet (500 mg total) by mouth 2 (two) times a day with meals    [DISCONTINUED] DULoxetine (CYMBALTA) 20 mg capsule Take 2 caps PO daily (Patient not taking: Reported on 3/25/2021)    [DISCONTINUED] tiZANidine (ZANAFLEX) 2 mg tablet Take 1 tablet (2 mg total) by mouth 2 (two) times a day (Patient not taking: Reported on 3/25/2021)     No current facility-administered medications on file prior to visit  No Known Allergies    Physical Exam    /81   Pulse 65   Temp 97 6 °F (36 4 °C) (Temporal)   Ht 5' 4" (1 626 m)   Wt 80 3 kg (177 lb)   BMI 30 38 kg/m²     Constitutional: normal, well developed, well nourished, alert, in no distress and non-toxic and no overt pain behavior    Eyes: anicteric  HEENT: grossly intact  Neck: supple, symmetric, trachea midline and no masses   Pulmonary:even and unlabored  Cardiovascular:No edema or pitting edema present  Skin:Normal without rashes or lesions and well hydrated  Psychiatric:Mood and affect appropriate  Neurologic:Cranial Nerves II-XII grossly intact  Musculoskeletal:normal    Imaging

## 2021-03-25 NOTE — PATIENT INSTRUCTIONS
Trigger Point Pain   WHAT YOU NEED TO KNOW:   A trigger point is a tight, tender lump in your muscle  Trigger points may form after muscle injury, overuse, stress, or tension  Muscle stress may be from poor posture or an awkward sleep position  Emotional stress can make you tense certain muscles, such as in your neck  DISCHARGE INSTRUCTIONS:   Call your doctor or pain specialist if:   · You have new or worsening pain around the trigger point  · You have questions or concerns about your condition or care  Medicines: You may need any of the following:  · NSAIDs , such as ibuprofen, help decrease swelling, pain, and fever  NSAIDs can cause stomach bleeding or kidney problems in certain people  If you take blood thinner medicine, always ask your healthcare provider if NSAIDs are safe for you  Always read the medicine label and follow directions  · Prescription pain medicine  may be given  Ask your healthcare provider how to take this medicine safely  Some prescription pain medicines contain acetaminophen  Do not take other medicines that contain acetaminophen without talking to your healthcare provider  Too much acetaminophen may cause liver damage  Prescription pain medicine may cause constipation  Ask your healthcare provider how to prevent or treat constipation  · Medicine  may be given to help your muscles relax  · Take your medicine as directed  Contact your healthcare provider if you think your medicine is not helping or if you have side effects  Tell him or her if you are allergic to any medicine  Keep a list of the medicines, vitamins, and herbs you take  Include the amounts, and when and why you take them  Bring the list or the pill bottles to follow-up visits  Carry your medicine list with you in case of an emergency  Manage trigger point pain:   · Do regular stretches of the trigger point muscle  Place gentle pressure on the trigger point, and then stretch the muscle   Ask your healthcare provider for more information about how to stretch and apply pressure  · Apply heat to trigger point sites  Heat can help relax muscles and relieve trigger point pain  Use a heat pack or a heating pad set on low  Apply heat for 15 minutes every hour, or as directed  Follow up with your doctor or pain specialist as directed:  Write down your questions so you remember to ask them during your visits  © Copyright [address] Information is for End User's use only and may not be sold, redistributed or otherwise used for commercial purposes  All illustrations and images included in CareNotes® are the copyrighted property of [employer]  or [address] [place] [address]  The above information is an  only  It is not intended as medical advice for individual conditions or treatments  Talk to your doctor, nurse or pharmacist before following any medical regimen to see if it is safe and effective for you

## 2021-03-31 NOTE — PROGRESS NOTES
[facility] [address] [place] [address]    ASSESSMENT/PLAN: Yousuf Dalal is a 31 y o   who presents for annual gynecologic exam     Encounter for routine gynecologic examination  - Routine well woman exam completed today  - Cervical Cancer Screening: Current ASCCP Guidelines reviewed  Last Pap: 2021 states last pap smear was   Next Pap Due: Pap smear was done today as part of visit  She states that she had a procedure on cx in  and has not had follow-up since  - HPV Vaccination status: Not immunized   Desires  - STI screening offered including HIV testing: does not desire testing    - Contraceptive counseling discussed  Current contraception: none   - RTO 1 yr for annual exam, will contact patient when birth control and Gardasil coverage checked  Additional problems addressed during this visit:  1  Encounter for annual routine gynecological examination  -     Liquid-based pap, screening    2  Encounter for screening for malignant neoplasm of cervix  -     Liquid-based pap, screening    3  Birth control counseling  - after thorough review of all birth control options  risks and benefits, S E,  and administration  desires Nexplanon  Given written information and will check if prior off needed  Seen and assessed with Samantha GARCIA    CC:  Annual Gynecologic Examination    HPI: Yousuf Dalal is a 31 y o   who presents for annual gynecologic examination  Nazario Renner presents today for gyn exam and desires birth control  States that she had abnormal pap  - believes that she had a Leep  Not currently sexually active  Interested in [address]  Has used the patch in the past, but after review of all birth control options, Nazario Renner feels that she  would like to have Nexplanon insertion  She notes 6-8 hours of sleep per night good intake of calcium rich foods on a daily    States that she is not currently sexually active and does not desire STI testing  The following portions of the patient's history were reviewed and updated as appropriate: allergies, current medications, past family history, past medical history, obstetric history, gynecologic history, past social history, past surgical history and problem list     Review of Systems   Constitutional: Negative for activity change, appetite change, fatigue and fever  Respiratory: Negative for cough and shortness of breath  Cardiovascular: Negative for chest pain, palpitations and leg swelling  Gastrointestinal: Negative for constipation and diarrhea  Genitourinary: Negative for difficulty urinating, frequency, vaginal bleeding, vaginal discharge and vaginal pain  Neurological: Negative for light-headedness and headaches  Psychiatric/Behavioral: The patient is not nervous/anxious  Objective:  /74 (BP Location: Right arm, Patient Position: Sitting, Cuff Size: Standard)   Ht 5' 4" (1 626 m)   Wt 79 5 kg (175 lb 4 8 oz)   LMP 03/17/2021 (Exact Date)   BMI 30 09 kg/m²    Physical Exam  Vitals signs reviewed  Constitutional:       Appearance: Normal appearance  HENT:      Head: Normocephalic  Neck:      Musculoskeletal: Normal range of motion  Thyroid: No thyroid mass or thyroid tenderness  Cardiovascular:      Rate and Rhythm: Normal rate and regular rhythm  Heart sounds: Normal heart sounds  Pulmonary:      Effort: Pulmonary effort is normal       Breath sounds: Normal breath sounds  Chest:      Breasts:         Right: No mass, nipple discharge, skin change or tenderness  Left: No mass, nipple discharge, skin change or tenderness  Abdominal:      General: There is no distension  Palpations: There is no mass  Tenderness: There is no abdominal tenderness  There is no guarding  Genitourinary:     General: Normal vulva  Exam position: Lithotomy position  Labia:         Right: No tenderness or lesion           Left: No tenderness or lesion  Vagina: No vaginal discharge, tenderness, bleeding or lesions  Cervix: No discharge, lesion, erythema or cervical bleeding  Uterus: Normal  Not enlarged and not tender  Adnexa:         Right: No mass, tenderness or fullness  Left: No mass, tenderness or fullness  Lymphadenopathy:      Upper Body:      Right upper body: No axillary adenopathy  Left upper body: No axillary adenopathy  Skin:     General: Skin is warm and dry  Neurological:      Mental Status: She is alert     Psychiatric:         Mood and Affect: Mood normal          Behavior: Behavior normal          Judgment: Judgment normal

## 2021-04-01 ENCOUNTER — OFFICE VISIT (OUTPATIENT)
Dept: OBGYN CLINIC | Facility: MEDICAL CENTER | Age: 31
End: 2021-04-01
Payer: COMMERCIAL

## 2021-04-01 VITALS
HEIGHT: 64 IN | SYSTOLIC BLOOD PRESSURE: 116 MMHG | DIASTOLIC BLOOD PRESSURE: 74 MMHG | WEIGHT: 175.3 LBS | BODY MASS INDEX: 29.93 KG/M2

## 2021-04-01 DIAGNOSIS — Z12.4 ENCOUNTER FOR SCREENING FOR MALIGNANT NEOPLASM OF CERVIX: ICD-10-CM

## 2021-04-01 DIAGNOSIS — Z30.09 BIRTH CONTROL COUNSELING: ICD-10-CM

## 2021-04-01 DIAGNOSIS — Z01.419 ENCOUNTER FOR ANNUAL ROUTINE GYNECOLOGICAL EXAMINATION: Primary | ICD-10-CM

## 2021-04-01 PROCEDURE — G0124 SCREEN C/V THIN LAYER BY MD: HCPCS | Performed by: PATHOLOGY

## 2021-04-01 PROCEDURE — G0145 SCR C/V CYTO,THINLAYER,RESCR: HCPCS | Performed by: PATHOLOGY

## 2021-04-01 PROCEDURE — 87624 HPV HI-RISK TYP POOLED RSLT: CPT | Performed by: ADVANCED PRACTICE MIDWIFE

## 2021-04-01 PROCEDURE — 99385 PREV VISIT NEW AGE 18-39: CPT | Performed by: ADVANCED PRACTICE MIDWIFE

## 2021-04-01 PROCEDURE — 0503F POSTPARTUM CARE VISIT: CPT | Performed by: ADVANCED PRACTICE MIDWIFE

## 2021-04-06 ENCOUNTER — TELEPHONE (OUTPATIENT)
Dept: OBGYN CLINIC | Facility: MEDICAL CENTER | Age: 31
End: 2021-04-06

## 2021-04-06 LAB
HPV HR 12 DNA CVX QL NAA+PROBE: POSITIVE
HPV16 DNA CVX QL NAA+PROBE: NEGATIVE
HPV18 DNA CVX QL NAA+PROBE: NEGATIVE

## 2021-04-06 NOTE — TELEPHONE ENCOUNTER
----- Message from Anitha Conti sent at 4/6/2021 10:57 AM EDT -----  Regarding: RE: coverage  I contacted pt's insurance  Effective 12/15/19 and active  Pt is covered for insertion, removal and device at 100%  No PA needed  [address]09 is also a covered benefit for pt  [place] M  4/6/21 at 0900  Pt is aware  Appt scheduled  ----- Message -----  From: Susie Bernal CNM  Sent: 4/1/2021   3:20 PM EDT  To: Anitha Conti  Subject: coverage                                         Please check on coverage for Nexplanon and Gardasil   Thank you

## 2021-04-12 LAB
LAB AP GYN PRIMARY INTERPRETATION: ABNORMAL
Lab: ABNORMAL
PATH INTERP SPEC-IMP: ABNORMAL

## 2021-04-29 ENCOUNTER — OFFICE VISIT (OUTPATIENT)
Dept: OBGYN CLINIC | Facility: MEDICAL CENTER | Age: 31
End: 2021-04-29
Payer: COMMERCIAL

## 2021-04-29 ENCOUNTER — PROCEDURE VISIT (OUTPATIENT)
Dept: OBGYN CLINIC | Facility: MEDICAL CENTER | Age: 31
End: 2021-04-29
Payer: COMMERCIAL

## 2021-04-29 VITALS
WEIGHT: 174.9 LBS | DIASTOLIC BLOOD PRESSURE: 76 MMHG | SYSTOLIC BLOOD PRESSURE: 110 MMHG | BODY MASS INDEX: 29.86 KG/M2 | HEIGHT: 64 IN

## 2021-04-29 VITALS
BODY MASS INDEX: 29.02 KG/M2 | HEART RATE: 85 BPM | DIASTOLIC BLOOD PRESSURE: 80 MMHG | HEIGHT: 64 IN | SYSTOLIC BLOOD PRESSURE: 107 MMHG | WEIGHT: 170 LBS

## 2021-04-29 DIAGNOSIS — R87.618 ABNORMAL PAPANICOLAOU SMEAR OF CERVIX WITH POSITIVE HUMAN PAPILLOMA VIRUS (HPV) TEST: Primary | ICD-10-CM

## 2021-04-29 DIAGNOSIS — M25.562 BILATERAL CHRONIC KNEE PAIN: ICD-10-CM

## 2021-04-29 DIAGNOSIS — G89.29 BILATERAL CHRONIC KNEE PAIN: ICD-10-CM

## 2021-04-29 DIAGNOSIS — M54.6 CHRONIC BILATERAL THORACIC BACK PAIN: Primary | ICD-10-CM

## 2021-04-29 DIAGNOSIS — G89.29 CHRONIC BILATERAL THORACIC BACK PAIN: Primary | ICD-10-CM

## 2021-04-29 DIAGNOSIS — A69.23 LYME ARTHRITIS (HCC): ICD-10-CM

## 2021-04-29 DIAGNOSIS — M25.561 BILATERAL CHRONIC KNEE PAIN: ICD-10-CM

## 2021-04-29 LAB — SL AMB POCT URINE HCG: NEGATIVE

## 2021-04-29 PROCEDURE — 88344 IMHCHEM/IMCYTCHM EA MLT ANTB: CPT | Performed by: PATHOLOGY

## 2021-04-29 PROCEDURE — 99203 OFFICE O/P NEW LOW 30 MIN: CPT | Performed by: EMERGENCY MEDICINE

## 2021-04-29 PROCEDURE — 88305 TISSUE EXAM BY PATHOLOGIST: CPT | Performed by: PATHOLOGY

## 2021-04-29 PROCEDURE — 57454 BX/CURETT OF CERVIX W/SCOPE: CPT | Performed by: ADVANCED PRACTICE MIDWIFE

## 2021-04-29 PROCEDURE — 81025 URINE PREGNANCY TEST: CPT | Performed by: ADVANCED PRACTICE MIDWIFE

## 2021-04-29 RX ORDER — METHYLPREDNISOLONE 4 MG/1
TABLET ORAL
Qty: 1 EACH | Refills: 0 | Status: SHIPPED | OUTPATIENT
Start: 2021-04-29 | End: 2021-07-28

## 2021-04-29 NOTE — PATIENT INSTRUCTIONS
Colposcopy   WHAT YOU NEED TO KNOW:   You may have light bleeding or spotting after the procedure  If a biopsy was taken, you may have cramping and bleeding for several days  If medicine was used to control bleeding, you may have brown or black discharge  DISCHARGE INSTRUCTIONS:   Seek care immediately if:   · You have severe pain in your lower abdomen  · You soak through 1 sanitary pad in 1 hour or less  · You feel weak, dizzy, or faint  Contact your healthcare provider if:   · You have a fever, chills, or foul-smelling discharge  · You have bleeding with clots  · Your pain gets worse or does not get better after you take pain medicine  · You have questions or concerns about your condition or care  Medicines:   · NSAIDs , such as ibuprofen, help decrease swelling, pain, and fever  NSAIDs can cause stomach bleeding or kidney problems in certain people  If you take blood thinner medicine, always ask your healthcare provider if NSAIDs are safe for you  Always read the medicine label and follow directions  · Take your medicine as directed  Contact your healthcare provider if you think your medicine is not helping or if you have side effects  Tell him or her if you are allergic to any medicine  Keep a list of the medicines, vitamins, and herbs you take  Include the amounts, and when and why you take them  Bring the list or the pill bottles to follow-up visits  Carry your medicine list with you in case of an emergency  Activity:  If no biopsy was taken, you can resume your usual activities after the procedure  If a biopsy was taken, rest as directed  Do not exercise, play sports, or lift anything heavier than 5 pounds  Ask your healthcare provider when you can return to your usual activities  Do not put anything in your vagina for 2 weeks: If a biopsy was taken, do not douche, use medicines in your vagina, or have sex  Do not use tampons  Instead, wear a sanitary pad for bleeding  Follow up with your healthcare provider as directed:  Write down your questions so you remember to ask them during your visits  © Copyright [address] Information is for End User's use only and may not be sold, redistributed or otherwise used for commercial purposes  All illustrations and images included in CareNotes® are the copyrighted property of [employer]  or [facility] Sophie Jessica   The above information is an  only  It is not intended as medical advice for individual conditions or treatments  Talk to your doctor, nurse or pharmacist before following any medical regimen to see if it is safe and effective for you      NO SEX FOR 2 WEEKS
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
## 2021-04-29 NOTE — PROGRESS NOTES
Colposcopy    Date/Time: 4/29/2021 2:37 PM  Performed by: Danuta Acosta CNM  Authorized by: Danuta Acosta CNM     Consent:     Consent obtained:  Written    Consent given by:  Patient    Procedural risks discussed:  Bleeding, infection, repeat procedure and damage to other organs    Patient questions answered: yes      Patient agrees, verbalizes understanding, and wants to proceed: yes      Educational handouts given: yes      Instructions and paperwork completed: yes    Pre-procedure:     Pre-procedure timeout performed: yes      Prepped with: acetic acid    Indication:     Indication:  ASC-US (Positive HPV other)  Procedure:     Procedure: Colposcopy w/ cervical biopsy and ECC      Under satisfactory analgesia the patient was prepped and draped in the dorsal lithotomy position: yes      Comptche speculum was placed in the vagina: yes      Under colposcopic examination the transition zone was seen in entirety: yes      Endocervix was curetted using a Kevorkian curette: yes      Cervical biopsy performed with a cervical biopsy punch: yes      Monsel's solution applied: silver nitrate applied  Biopsy(s): yes      Location:  12 o'clock    Specimen to pathology: yes    Post-procedure:     Findings: White epithelium      Impression: Low grade cervical dysplasia      Patient tolerance of procedure: Tolerated well, no immediate complications  Comments:      Post colpo instructions reviewed  To call if bleeding or foul discharge  RTO in 1 week for Nexplanon insertion and review of results

## 2021-04-29 NOTE — PATIENT INSTRUCTIONS
While taking the oral steroid Medrol Dose Pack, do not take any NSAIDs such as Advil, Motrin, ibuprofen, Motrin, Aleve or naproxen  You can restart the NSAIDs after you finish the steroids  However you may take Tylenol with these medications    While taking oral steroids, you may experience mild side effects such as feeling jittery or flushing  Please call if your side effects are significant or you have any questions

## 2021-05-06 ENCOUNTER — PROCEDURE VISIT (OUTPATIENT)
Dept: OBGYN CLINIC | Facility: MEDICAL CENTER | Age: 31
End: 2021-05-06
Payer: COMMERCIAL

## 2021-05-06 VITALS
DIASTOLIC BLOOD PRESSURE: 74 MMHG | BODY MASS INDEX: 30.42 KG/M2 | HEIGHT: 64 IN | WEIGHT: 178.2 LBS | SYSTOLIC BLOOD PRESSURE: 112 MMHG

## 2021-05-06 DIAGNOSIS — Z30.46 ENCOUNTER FOR SURVEILLANCE OF IMPLANTABLE SUBDERMAL CONTRACEPTIVE: ICD-10-CM

## 2021-05-06 DIAGNOSIS — Z30.017 NEXPLANON INSERTION: Primary | ICD-10-CM

## 2021-05-06 DIAGNOSIS — R87.613 HGSIL ON CYTOLOGIC SMEAR OF CERVIX: ICD-10-CM

## 2021-05-06 DIAGNOSIS — B97.7 HIGH RISK HUMAN PAPILLOMA VIRUS INFECTION: ICD-10-CM

## 2021-05-06 LAB — SL AMB POCT URINE HCG: NORMAL

## 2021-05-06 PROCEDURE — 99212 OFFICE O/P EST SF 10 MIN: CPT | Performed by: ADVANCED PRACTICE MIDWIFE

## 2021-05-06 PROCEDURE — 81025 URINE PREGNANCY TEST: CPT | Performed by: ADVANCED PRACTICE MIDWIFE

## 2021-05-06 PROCEDURE — 11981 INSERTION DRUG DLVR IMPLANT: CPT | Performed by: ADVANCED PRACTICE MIDWIFE

## 2021-05-06 NOTE — PROGRESS NOTES
Universal Protocol:  Consent: Written consent obtained  Risks and benefits: risks, benefits and alternatives were discussed  Consent given by: patient  Time out: Immediately prior to procedure a "time out" was called to verify the correct patient, procedure, equipment, support staff and site/side marked as required  Timeout called at: 5/6/2021 4:49 PM   Patient understanding: patient states understanding of the procedure being performed  Patient consent: the patient's understanding of the procedure matches consent given  Procedure consent: procedure consent matches procedure scheduled  Relevant documents: relevant documents present and verified  Test results: test results available and properly labeled  Site marked: the operative site was marked  Radiology Images displayed and confirmed  If images not available, report reviewed: imaging studies available  Required items: required blood products, implants, devices, and special equipment available  Patient identity confirmed: verbally with patient    Remove and insert drug implant    Date/Time: 5/6/2021 4:14 PM  Performed by: Destinee Martines CNM  Authorized by: Destinee Martines CNM     Indication:     Indication: Insertion of non-biodegradable drug delivery implant    Pre-procedure:     Pre-procedure timeout performed: yes      Prepped with: alcohol 70% and povidone-iodine      Local anesthetic:  Lidocaine 1%    The site was cleaned and prepped in a sterile fashion: yes    Procedure:     Procedure: Insertion    Left/right:  Left    Preloaded contraceptive capsule trocar was placed subdermally: yes      Contraceptive capsule was inserted and trocar removed: yes      Visualization of notch in stylet and palpation of device: yes      Palpation confirms placement by provider and patient: yes      Site was closed with steri-strips and pressure bandage applied: yes    Comments:      Post insertion instructions reviewed  Keep clean and dry  Use condoms first month   To call if any redness, itching, swelling, at insertion site  Will schedule follow-up with physician to discuss colpo results and check site

## 2021-05-07 PROBLEM — B97.7 HIGH RISK HUMAN PAPILLOMA VIRUS INFECTION: Status: ACTIVE | Noted: 2021-05-07

## 2021-05-07 NOTE — PROGRESS NOTES
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    HGSIL on cytologic smear of cervix  Colposcopy report strongly suggestive of HGSIL- endocervix  - Will schedule treatment follow-up with one of the physicians in the practice  - Will check on Gardasil 9 coverage for patient- message sent to prior auth dept          Subjective:   Rachel Turner is a 31 y o   female  CC: review of colpo results    HPI: Bernice Del Castillo is here to discuss results of colpo  And have Nexplanon insertion  She denies discharge, bleeding, pain or odor  There is strong suspect of HGSIL on sampling of endocervix  Bernice Del Castillo does not plan to have any more children and at this time would like to proceed with treatment options  We discussed possible procedures:  Leep and [facility] , risk/benefit, recovery and follow-up post procedures  We are also checking on coverage for Gardasil 9       Case Report  Surgical Pathology Report                         Case: [id number]                                   Authorizing Provider: Geoffrey West CNM             Collected:           2021 1511              Ordering Location:     [facility]  Received:            2021 [address] [place] [address]                                                           Pathologist:           Kelsie Garibay MD                                                                Specimens:   A) - Cervix, Endocervical, ecc                                                                      B) - Cervix, 12:00 oclock                                                                Final Diagnosis  A  Endocervix (curetting):     - Small detached portions of dysplastic squamous mucosa, at least LSIL and focally suspicious for HSIL  See comment  - Endocervical glandular mucosa with microglandular hyperplasia and cervicitis      Comment:  Few detached portions of dysplastic squamous mucosa are noted  Increased P16 and Ki-67 are present, suspicious for HSIL  B  12:00, cervix (biopsy): - Ectocervical squamous mucosa with focus of reactive change versus HPV effect        - No high-grade dysplasia or malignancy identified  Electronically signed by Maria Elena Burdick MD on 5/6/2021 at 10:02 AM   Preliminary result electronically signed by Maria Elena Burdick MD on 5/3/2021 at  8:22 AM  Comments: This is an appended report  These results have been appended to a previously preliminary verified report  Preliminary Diagnosis  Additional studies pending      A  Endocervix (curetting):     - Cervical transformation zone with cervicitis and microglandular hyperplasia  B  12:00, cervix (biopsy): - Ectocervical squamous mucosa with suggestion of HPV effect  Preliminary result electronically signed by Maria Elnea Burdick MD on 5/3/2021 at  3:14 PM  Comments: This is an appended report  These results have been appended to a previously preliminary verified report  Microscopic Description   - Immunohistochemical studies (with appropriate controls) demonstrate:     Part B: Multiplex P16 / Ki-67 with absent P16 and minimal Ki-67  Comment: This is an appended report  These results have been appended to a previously preliminary verified report  Note   - Intradepartmental consultation concurs with the diagnosis  Comment: This is an appended report  These results have been appended to a previously preliminary verified report            ROS: Review of Systems   Constitutional: Negative for activity change, appetite change, chills, fatigue and fever  Genitourinary: Negative for difficulty urinating, dysuria, frequency, menstrual problem, pelvic pain, vaginal bleeding, vaginal discharge and vaginal pain  PFSH: The following portions of the patient's history were reviewed and updated as appropriate: allergies, current medications, past family history, past medical history, obstetric history, gynecologic history, past social history, past surgical history and problem list        Objective:  /74 (BP Location: Right arm, Patient Position: Sitting)   Ht 5' 4" (1 626 m)   Wt 80 8 kg (178 lb 3 2 oz)   LMP 04/15/2021 (Approximate)   BMI 30 59 kg/m²    Physical Exam  Constitutional:       Appearance: Normal appearance  Cardiovascular:      Rate and Rhythm: Normal rate  Heart sounds: Normal heart sounds  Pulmonary:      Effort: Pulmonary effort is normal    Neurological:      Mental Status: She is alert

## 2021-05-11 ENCOUNTER — LAB (OUTPATIENT)
Dept: LAB | Facility: MEDICAL CENTER | Age: 31
End: 2021-05-11
Payer: COMMERCIAL

## 2021-05-11 DIAGNOSIS — M54.6 CHRONIC BILATERAL THORACIC BACK PAIN: ICD-10-CM

## 2021-05-11 DIAGNOSIS — M25.562 BILATERAL CHRONIC KNEE PAIN: ICD-10-CM

## 2021-05-11 DIAGNOSIS — A69.23 LYME ARTHRITIS (HCC): ICD-10-CM

## 2021-05-11 DIAGNOSIS — M25.561 BILATERAL CHRONIC KNEE PAIN: ICD-10-CM

## 2021-05-11 DIAGNOSIS — G89.29 BILATERAL CHRONIC KNEE PAIN: ICD-10-CM

## 2021-05-11 DIAGNOSIS — G89.29 CHRONIC BILATERAL THORACIC BACK PAIN: ICD-10-CM

## 2021-05-11 LAB — CRP SERPL QL: <3 MG/L

## 2021-05-11 PROCEDURE — 86140 C-REACTIVE PROTEIN: CPT

## 2021-05-11 PROCEDURE — 86430 RHEUMATOID FACTOR TEST QUAL: CPT

## 2021-05-11 PROCEDURE — 86618 LYME DISEASE ANTIBODY: CPT

## 2021-05-11 PROCEDURE — 86200 CCP ANTIBODY: CPT

## 2021-05-11 PROCEDURE — 86038 ANTINUCLEAR ANTIBODIES: CPT

## 2021-05-11 PROCEDURE — 36415 COLL VENOUS BLD VENIPUNCTURE: CPT

## 2021-05-12 LAB
B BURGDOR IGG+IGM SER-ACNC: 5
RHEUMATOID FACT SER QL LA: NEGATIVE
RYE IGE QN: NEGATIVE

## 2021-05-14 LAB — CCP AB SER IA-ACNC: 0.8

## 2021-05-27 ENCOUNTER — OFFICE VISIT (OUTPATIENT)
Dept: OBGYN CLINIC | Facility: MEDICAL CENTER | Age: 31
End: 2021-05-27
Payer: COMMERCIAL

## 2021-05-27 ENCOUNTER — APPOINTMENT (OUTPATIENT)
Dept: RADIOLOGY | Facility: MEDICAL CENTER | Age: 31
End: 2021-05-27
Payer: COMMERCIAL

## 2021-05-27 VITALS
HEIGHT: 64 IN | DIASTOLIC BLOOD PRESSURE: 84 MMHG | HEART RATE: 82 BPM | SYSTOLIC BLOOD PRESSURE: 119 MMHG | BODY MASS INDEX: 30.05 KG/M2 | WEIGHT: 176 LBS

## 2021-05-27 DIAGNOSIS — M25.561 BILATERAL CHRONIC KNEE PAIN: ICD-10-CM

## 2021-05-27 DIAGNOSIS — M25.562 BILATERAL CHRONIC KNEE PAIN: ICD-10-CM

## 2021-05-27 DIAGNOSIS — G89.29 CHRONIC BILATERAL THORACIC BACK PAIN: ICD-10-CM

## 2021-05-27 DIAGNOSIS — M54.6 CHRONIC BILATERAL THORACIC BACK PAIN: Primary | ICD-10-CM

## 2021-05-27 DIAGNOSIS — G89.29 BILATERAL CHRONIC KNEE PAIN: ICD-10-CM

## 2021-05-27 DIAGNOSIS — M54.6 CHRONIC BILATERAL THORACIC BACK PAIN: ICD-10-CM

## 2021-05-27 DIAGNOSIS — G89.29 CHRONIC BILATERAL THORACIC BACK PAIN: Primary | ICD-10-CM

## 2021-05-27 PROCEDURE — 99214 OFFICE O/P EST MOD 30 MIN: CPT | Performed by: EMERGENCY MEDICINE

## 2021-05-27 PROCEDURE — 72072 X-RAY EXAM THORAC SPINE 3VWS: CPT

## 2021-05-27 RX ORDER — PREDNISONE 20 MG/1
20 TABLET ORAL 2 TIMES DAILY WITH MEALS
Qty: 10 TABLET | Refills: 0 | Status: SHIPPED | OUTPATIENT
Start: 2021-05-27 | End: 2021-07-28
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
## 2021-05-27 NOTE — PATIENT INSTRUCTIONS
While taking the oral steroid Prednisone, do not take any NSAIDs such as Advil, Motrin, ibuprofen, Motrin, Aleve or naproxen  You can restart the NSAIDs after you finish the steroids  However you may take Tylenol with these medications    While taking oral steroids, you may experience mild side effects such as feeling jittery or flushing  Please call if your side effects are significant or you have any questions

## 2021-07-07 ENCOUNTER — TELEPHONE (OUTPATIENT)
Dept: OBGYN CLINIC | Facility: MEDICAL CENTER | Age: 31
End: 2021-07-07

## 2021-07-12 ENCOUNTER — HOSPITAL ENCOUNTER (OUTPATIENT)
Dept: MRI IMAGING | Facility: HOSPITAL | Age: 31
Discharge: HOME | End: 2021-07-12
Payer: COMMERCIAL

## 2021-07-12 DIAGNOSIS — M54.6 CHRONIC BILATERAL THORACIC BACK PAIN: ICD-10-CM

## 2021-07-12 DIAGNOSIS — G89.29 CHRONIC BILATERAL THORACIC BACK PAIN: ICD-10-CM

## 2021-07-12 PROCEDURE — 72146 MRI CHEST SPINE W/O DYE: CPT

## 2021-07-12 PROCEDURE — G1004 CDSM NDSC: HCPCS

## 2021-07-16 ENCOUNTER — PROCEDURE VISIT (OUTPATIENT)
Dept: OBGYN CLINIC | Facility: MEDICAL CENTER | Age: 31
End: 2021-07-16
Payer: COMMERCIAL

## 2021-07-16 VITALS
HEIGHT: 64 IN | SYSTOLIC BLOOD PRESSURE: 108 MMHG | DIASTOLIC BLOOD PRESSURE: 70 MMHG | BODY MASS INDEX: 30.05 KG/M2 | WEIGHT: 176 LBS

## 2021-07-16 DIAGNOSIS — B97.7 HIGH RISK HUMAN PAPILLOMA VIRUS INFECTION: ICD-10-CM

## 2021-07-16 DIAGNOSIS — N94.89: ICD-10-CM

## 2021-07-16 DIAGNOSIS — N87.9 CERVICAL DYSPLASIA: Primary | ICD-10-CM

## 2021-07-16 PROCEDURE — 87480 CANDIDA DNA DIR PROBE: CPT | Performed by: STUDENT IN AN ORGANIZED HEALTH CARE EDUCATION/TRAINING PROGRAM

## 2021-07-16 PROCEDURE — 88344 IMHCHEM/IMCYTCHM EA MLT ANTB: CPT | Performed by: PATHOLOGY

## 2021-07-16 PROCEDURE — 87660 TRICHOMONAS VAGIN DIR PROBE: CPT | Performed by: STUDENT IN AN ORGANIZED HEALTH CARE EDUCATION/TRAINING PROGRAM

## 2021-07-16 PROCEDURE — 57454 BX/CURETT OF CERVIX W/SCOPE: CPT | Performed by: STUDENT IN AN ORGANIZED HEALTH CARE EDUCATION/TRAINING PROGRAM

## 2021-07-16 PROCEDURE — 87591 N.GONORRHOEAE DNA AMP PROB: CPT | Performed by: STUDENT IN AN ORGANIZED HEALTH CARE EDUCATION/TRAINING PROGRAM

## 2021-07-16 PROCEDURE — 88305 TISSUE EXAM BY PATHOLOGIST: CPT | Performed by: PATHOLOGY

## 2021-07-16 PROCEDURE — 87510 GARDNER VAG DNA DIR PROBE: CPT | Performed by: STUDENT IN AN ORGANIZED HEALTH CARE EDUCATION/TRAINING PROGRAM

## 2021-07-16 PROCEDURE — 87491 CHLMYD TRACH DNA AMP PROBE: CPT | Performed by: STUDENT IN AN ORGANIZED HEALTH CARE EDUCATION/TRAINING PROGRAM

## 2021-07-16 PROCEDURE — 99214 OFFICE O/P EST MOD 30 MIN: CPT | Performed by: STUDENT IN AN ORGANIZED HEALTH CARE EDUCATION/TRAINING PROGRAM

## 2021-07-16 NOTE — ASSESSMENT & PLAN NOTE
- We reviewed her most recent Pap and cervical biopsies in detail   - We reviewed that ASC-H and HPV OHR is associated with a 26% chance of CIN3+ based on most up to date [facility] data  - We reviewed her colposcopy biopsy done by one of our office's providers back in April which showed "suspicion for focal HSIL" on ECC  I explained that she has an option to pursue LEEP based on this finding, but she opted for close surveillance with repeat ECC in 3 months  - We performed colposcopy with ECC today  An acetowhite area at 5 o'clock was sampled with punch biopsy  Aggressive ECC was attempted but only small fragments of tissue were taken  -  If focal high grade comes back again, would really recommend LEEP

## 2021-07-16 NOTE — PROGRESS NOTES
[facility] [address] [place] [address]    Colposcopy    Date/Time: 7/16/2021 1:44 PM  Performed by: Danielle Lux MD  Authorized by: Danielle Lux MD     Consent:     Consent obtained:  Written    Consent given by:  Patient    Procedural risks discussed:  Bleeding    Patient questions answered: yes      Patient agrees, verbalizes understanding, and wants to proceed: yes      Educational handouts given: yes      Instructions and paperwork completed: yes    Pre-procedure:     Pre-procedure timeout performed: yes    Indication:     Indication:  ASC-H  Procedure:     Procedure: Colposcopy w/ cervical biopsy and ECC      Under satisfactory analgesia the patient was prepped and draped in the dorsal lithotomy position: yes      [place] speculum was placed in the vagina: yes      Under colposcopic examination the transition zone was seen in entirety: yes      Intracervical block was performed: no      Endocervix was curetted using a Kevorkian curette: yes      Cervical biopsy performed with a cervical biopsy punch: yes      Biopsy(s): yes      Location:  5 o'clock    Specimen to pathology: yes    Post-procedure:     Findings: White epithelium      Impression: Low grade cervical dysplasia      Patient tolerance of procedure:   Tolerated well, no immediate complications      Sharonda Marie MD  [address] [place] [address]  7/16/2021 1:46 PM

## 2021-07-16 NOTE — PROGRESS NOTES
[facility] [address] [place] [address]    Assessment/Plan:  Negrita Weiss is a 27 y o   who presents for colposcopy for biopsies in April showing possible focal CIN2 on ECC  Cervical dysplasia  - We reviewed her most recent Pap and cervical biopsies in detail   - We reviewed that ASC-H and HPV OHR is associated with an approximately 20% chance of CIN3+ based on most up to date ASC data  - We reviewed her colposcopy biopsy done by one of our office's providers back in April which showed "suspicion for focal HSIL" on ECC  I explained that she has an option to pursue LEEP based on this finding, but she opted for close surveillance with repeat ECC in 3 months  - We performed colposcopy with ECC today  An acetowhite area at 5 o'clock was sampled with punch biopsy  Aggressive ECC was attempted but only small fragments of tissue were taken  -  If focal high grade comes back again, would really recommend LEEP  Diagnoses and all orders for this visit:    Cervical dysplasia    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  -     Tissue Exam    Tenderness on motion of cervix  -     VAGINOSIS DNA PROBE (AFFIRM)  -     Chlamydia/GC amplified DNA by PCR          Subjective:   Negrita Weiss is a 27 y o   female  CC: Colposcopy    HPI: Juany Jama presents for colposcopy  She had ASC-H pap with HPV OHR on Pap in April with Alejandra and then Colpo with biopsy showing LSIL and ECC showing focal possible suspicion for HSIL  She opted for close surveillance with repeat colposcopy in 3 months and declined LEEP       ROS: Review of Systems   Constitutional: Negative for chills and fever  Respiratory: Negative for cough and shortness of breath  Cardiovascular: Negative for chest pain and leg swelling  Gastrointestinal: Negative for abdominal pain, nausea and vomiting  Genitourinary: Negative for dysuria, frequency and urgency     Neurological: Negative for dizziness, light-headedness and headaches  PFSH: The following portions of the patient's history were reviewed and updated as appropriate: allergies, current medications, past family history, past medical history, obstetric history, gynecologic history, past social history, past surgical history and problem list        Objective:  /70   Ht 5' 4" (1 626 m)   Wt 79 8 kg (176 lb)   BMI 30 21 kg/m²    Physical Exam  Constitutional:       Appearance: Normal appearance  HENT:      Head: Normocephalic and atraumatic  Cardiovascular:      Rate and Rhythm: Normal rate  Pulmonary:      Effort: Pulmonary effort is normal    Abdominal:      General: There is no distension  Tenderness: There is no abdominal tenderness  There is no guarding  Genitourinary:     Exam position: Lithotomy position  Pubic Area: No rash  Labia:         Right: No rash, tenderness or lesion  Left: No rash, tenderness or lesion  Urethra: No prolapse, urethral swelling or urethral lesion  Vagina: No vaginal discharge, erythema, tenderness, bleeding or lesions  Cervix: Cervical motion tenderness present  No discharge, friability or erythema  Lymphadenopathy:      Lower Body: No right inguinal adenopathy  No left inguinal adenopathy  Neurological:      Mental Status: She is alert             Jewell Castelan MD  [address] [place] [address]  7/16/2021 1:24 PM

## 2021-07-17 LAB
C TRACH DNA SPEC QL NAA+PROBE: NEGATIVE
N GONORRHOEA DNA SPEC QL NAA+PROBE: NEGATIVE

## 2021-07-18 LAB
CANDIDA RRNA VAG QL PROBE: NEGATIVE
G VAGINALIS RRNA GENITAL QL PROBE: POSITIVE
T VAGINALIS RRNA GENITAL QL PROBE: NEGATIVE

## 2021-07-19 DIAGNOSIS — B96.89 BV (BACTERIAL VAGINOSIS): Primary | ICD-10-CM

## 2021-07-19 DIAGNOSIS — N76.0 BV (BACTERIAL VAGINOSIS): Primary | ICD-10-CM

## 2021-07-19 RX ORDER — METRONIDAZOLE 500 MG/1
500 TABLET ORAL EVERY 12 HOURS SCHEDULED
Qty: 14 TABLET | Refills: 0 | Status: SHIPPED | OUTPATIENT
Start: 2021-07-19 | End: 2021-07-26

## 2021-07-27 NOTE — PATIENT INSTRUCTIONS
Loop Electrosurgical Excision Procedure   WHAT YOU NEED TO KNOW:   What do I need to know about a loop electrosurgical excision procedure (LEEP)? A LEEP is a procedure to remove abnormal tissue from your cervix or vagina  The tissue can be tested for cancer or infection  You may need a LEEP if other tests have found abnormal cells on your cervix or in your vagina  How do I prepare for a LEEP? Your healthcare provider will talk to you about how to prepare for your procedure  Do not douche, use tampons, or have sex for 24 hours before the procedure  Do not put medicines in your vagina for 24 hours before the procedure  Call your healthcare provider if you have your menstrual period on the day of the procedure  You may need to wait until your period ends to have the procedure  Arrange for someone to drive you home and stay with you after your procedure  What will happen during a LEEP? · Your healthcare provider will insert a speculum in your vagina  A speculum is an instrument that holds the vagina open so your provider can see your cervix  You will be given local anesthesia to numb your cervix  With local anesthesia, you may feel pressure or pushing during your procedure, but you should not feel pain  · Your healthcare provider will insert a tube with a looped wire at the end into your vagina  He or she will use this instrument to remove a sample of tissue from your cervix  You may feel pain or cramping when the sample is taken  You may also feel dizzy  Tell your healthcare provider if the dizziness gets worse  Your healthcare provider may use a paste or tools to control bleeding  What will happen after a LEEP? Your healthcare provider will monitor you for heavy bleeding  You may have cramping, bleeding, or dark brown discharge after your procedure  These symptoms may last up to 4 weeks  What are the risks of a LEEP? You may bleed more than expected or get an infection   Your menstrual periods may feel more painful after the procedure  You may have problems getting pregnant or be at risk for a miscarriage or  birth  If you do get pregnant, your baby may be underweight  CARE AGREEMENT:   You have the right to help plan your care  Learn about your health condition and how it may be treated  Discuss treatment options with your healthcare providers to decide what care you want to receive  You always have the right to refuse treatment  The above information is an  only  It is not intended as medical advice for individual conditions or treatments  Talk to your doctor, nurse or pharmacist before following any medical regimen to see if it is safe and effective for you  © Copyright [employer]  Information is for End User's use only and may not be sold, redistributed or otherwise used for commercial purposes   All illustrations and images included in CareNotes® are the copyrighted property of A D A M , Inc  or [address] [place] [address]

## 2021-07-28 ENCOUNTER — OFFICE VISIT (OUTPATIENT)
Dept: OBGYN CLINIC | Facility: MEDICAL CENTER | Age: 31
End: 2021-07-28
Payer: COMMERCIAL

## 2021-07-28 VITALS
BODY MASS INDEX: 30.56 KG/M2 | WEIGHT: 179 LBS | SYSTOLIC BLOOD PRESSURE: 110 MMHG | DIASTOLIC BLOOD PRESSURE: 70 MMHG | HEIGHT: 64 IN

## 2021-07-28 DIAGNOSIS — N87.9 CERVICAL DYSPLASIA: Primary | ICD-10-CM

## 2021-07-28 PROCEDURE — 99213 OFFICE O/P EST LOW 20 MIN: CPT | Performed by: NURSE PRACTITIONER

## 2021-07-28 NOTE — PROGRESS NOTES
Assessment/Plan:      Diagnoses and all orders for this visit:    Cervical dysplasia      -  Reviewed shows results in detail with patient  Reviewed recommendation for LEEP  Written information provided  Risks benefits and alternatives reviewed  - patient is unsure regarding future fertility is open to having more children if she meets the right person  Currently has Nexplanon for contraception  Would like more conservative LEEP with close follow-up  Is aware of possibility for repeat procedure if unable to obtain clear margins  - reviewed with patient processes for scheduling  Will be seen in office for H&P was started within 30 days of being scheduled in the OR       RTO H&P as scheduled     Subjective:     Patient ID: Harris Almodovar is a 27 y o  female  HPI  here to discussed recent results of Pap smear/colposcopy  Unsure regarding future fertility  Is open to having more children if she meets the right person  Currently using Nexplanon for contraception  Patient states her Pap smears have been intermittently abnormal for a long time  Has not had any other surgeries on her cervix then colposcopy  Pap smear 21 resulted ASCUS, cannot r/o HSIL/ HR HPV(+) non 16/18  Colposcopy/ ECC 21  Endocervix (curetting):     - Small detached portions of dysplastic squamous mucosa, at least LSIL and focally suspicious for HSIL  See comment  - Endocervical glandular mucosa with microglandular hyperplasia and cervicitis  12:00, cervix (biopsy): - Ectocervical squamous mucosa with focus of reactive change versus HPV effect        - No high-grade dysplasia or malignancy identified  Repeat colposcopy/ ECC 21  Endocervix (curetting):      - Portions of cervical transformation zone mucosa with squamous dysplasia, favor HSIL (see comment)    5:00, cervix (biopsy):     - Cervical transformation zone mucosa with marked cervicitis and focal squamous change equivocal for reactive change versus HPV effect        - Negative for high-grade dysplasia  Review of Systems   Constitutional: Negative for chills, fatigue and fever  Respiratory: Negative  Cardiovascular: Negative  Genitourinary: Negative  Objective:  /70   Ht 5' 4" (1 626 m)   Wt 81 2 kg (179 lb)   BMI 30 73 kg/m²      Physical Exam  Constitutional:       Appearance: Normal appearance  Cardiovascular:      Rate and Rhythm: Normal rate and regular rhythm  Heart sounds: Normal heart sounds  Pulmonary:      Effort: Pulmonary effort is normal       Breath sounds: Normal breath sounds  Neurological:      Mental Status: She is alert

## 2021-08-11 ENCOUNTER — TELEPHONE (OUTPATIENT)
Dept: OBGYN CLINIC | Facility: MEDICAL CENTER | Age: 31
End: 2021-08-11

## 2021-08-11 ENCOUNTER — PREP FOR PROCEDURE (OUTPATIENT)
Dept: OBGYN CLINIC | Facility: MEDICAL CENTER | Age: 31
End: 2021-08-11

## 2021-08-11 DIAGNOSIS — N87.9 DYSPLASIA OF CERVIX: Primary | ICD-10-CM

## 2021-08-11 NOTE — TELEPHONE ENCOUNTER
Pt wanted to have Leep with Dr Yuly Lau  Pt is scheduled on 09/22 - H&P and post op already scheduled

## 2021-08-19 ENCOUNTER — TELEPHONE (OUTPATIENT)
Dept: OBGYN CLINIC | Facility: MEDICAL CENTER | Age: 31
End: 2021-08-19

## 2021-08-19 NOTE — TELEPHONE ENCOUNTER
I checked pt's benefits for upcoming sx  Effective 12/15/19 and active  Pt will be covered at 100%  No PA needed for [id number]  8/19/21 at 1047

## 2021-08-23 ENCOUNTER — OFFICE VISIT (OUTPATIENT)
Dept: OBGYN CLINIC | Facility: MEDICAL CENTER | Age: 31
End: 2021-08-23
Payer: COMMERCIAL

## 2021-08-23 VITALS
DIASTOLIC BLOOD PRESSURE: 70 MMHG | HEIGHT: 64 IN | BODY MASS INDEX: 30.05 KG/M2 | SYSTOLIC BLOOD PRESSURE: 110 MMHG | WEIGHT: 176 LBS

## 2021-08-23 DIAGNOSIS — N89.8 VAGINAL DISCHARGE: ICD-10-CM

## 2021-08-23 DIAGNOSIS — N89.8 VAGINAL ODOR: ICD-10-CM

## 2021-08-23 DIAGNOSIS — B96.89 BV (BACTERIAL VAGINOSIS): Primary | ICD-10-CM

## 2021-08-23 DIAGNOSIS — N76.0 BV (BACTERIAL VAGINOSIS): Primary | ICD-10-CM

## 2021-08-23 PROCEDURE — 87660 TRICHOMONAS VAGIN DIR PROBE: CPT | Performed by: OBSTETRICS & GYNECOLOGY

## 2021-08-23 PROCEDURE — 99213 OFFICE O/P EST LOW 20 MIN: CPT | Performed by: OBSTETRICS & GYNECOLOGY

## 2021-08-23 PROCEDURE — 87109 MYCOPLASMA: CPT | Performed by: OBSTETRICS & GYNECOLOGY

## 2021-08-23 PROCEDURE — 87510 GARDNER VAG DNA DIR PROBE: CPT | Performed by: OBSTETRICS & GYNECOLOGY

## 2021-08-23 PROCEDURE — 87491 CHLMYD TRACH DNA AMP PROBE: CPT | Performed by: OBSTETRICS & GYNECOLOGY

## 2021-08-23 PROCEDURE — 87591 N.GONORRHOEAE DNA AMP PROB: CPT | Performed by: OBSTETRICS & GYNECOLOGY

## 2021-08-23 PROCEDURE — 87480 CANDIDA DNA DIR PROBE: CPT | Performed by: OBSTETRICS & GYNECOLOGY

## 2021-08-23 RX ORDER — METRONIDAZOLE 7.5 MG/G
1 GEL VAGINAL 2 TIMES DAILY
Qty: 70 G | Refills: 0 | Status: SHIPPED | OUTPATIENT
Start: 2021-08-23 | End: 2021-08-28

## 2021-08-24 NOTE — PROGRESS NOTES
A/P    1    Bacterial Vaginosis    Pt reports that she is getting regular BV   She was treated in July and it is back    She is with the same partener   She does have to use tampons to stop the discharge and odor      Wet mount- + Clue cells and +wiff test      Affirm, ,GC CT and Microplasum cultures sent     Metrogel x 5 days    Increase Vit D

## 2021-08-25 LAB
C TRACH DNA SPEC QL NAA+PROBE: NEGATIVE
CANDIDA RRNA VAG QL PROBE: NEGATIVE
G VAGINALIS RRNA GENITAL QL PROBE: POSITIVE
N GONORRHOEA DNA SPEC QL NAA+PROBE: NEGATIVE
T VAGINALIS RRNA GENITAL QL PROBE: NEGATIVE

## 2021-08-30 LAB
M HOMINIS SPEC QL CULT: NEGATIVE
U UREALYTICUM SPEC QL CULT: NEGATIVE

## 2021-09-06 ENCOUNTER — NURSE TRIAGE (OUTPATIENT)
Dept: OTHER | Facility: OTHER | Age: 31
End: 2021-09-06

## 2021-09-06 DIAGNOSIS — B37.9 ANTIBIOTIC-INDUCED YEAST INFECTION: Primary | ICD-10-CM

## 2021-09-06 DIAGNOSIS — T36.95XA ANTIBIOTIC-INDUCED YEAST INFECTION: Primary | ICD-10-CM

## 2021-09-06 RX ORDER — FLUCONAZOLE 150 MG/1
150 TABLET ORAL
Qty: 2 TABLET | Refills: 0 | Status: SHIPPED | OUTPATIENT
Start: 2021-09-06 | End: 2021-09-10

## 2021-09-06 NOTE — TELEPHONE ENCOUNTER
Provider on call sent prescription for diflucan  LMOM for patient  Reason for Disposition   Symptoms of a vaginal yeast infection (i e , white, thick, cottage-cheese-like, itchy, not bad smelling discharge)    Answer Assessment - Initial Assessment Questions  1  DISCHARGE: "Describe the discharge " (e g , white, yellow, green, gray, foamy, cottage cheese-like)      White cottage cheese     2  ODOR: "Is there a bad odor?"      No     3  ONSET: "When did the discharge begin?"      Been going on but noticed today     4  RASH: "Is there a rash in that area?" If Yes, ask: "Describe it " (e g , redness, blisters, sores, bumps)      No     5  ABDOMINAL PAIN: "Are you having any abdominal pain?" If Yes, ask: "What does it feel like? " (e g , crampy, dull, intermittent, constant)       No     6  ABDOMINAL PAIN SEVERITY: If present, ask: "How bad is it?"  (e g , mild, moderate, severe)   - MILD - doesn't interfere with normal activities    - MODERATE - interferes with normal activities or awakens from sleep    - SEVERE - patient doesn't want to move (R/O peritonitis)       N/A     7  CAUSE: "What do you think is causing the discharge?" "Have you had the same problem before? What happened then?"      Unsure was recently treated for BV     8  OTHER SYMPTOMS: "Do you have any other symptoms?" (e g , fever, itching, vaginal bleeding, pain with urination, injury to genital area, vaginal foreign body)      Severe itching     9   PREGNANCY: "Is there any chance you are pregnant?" "When was your last menstrual period?"      No    Protocols used: VAGINAL DISCHARGE-ADULT-

## 2021-09-06 NOTE — TELEPHONE ENCOUNTER
Regarding: Abnormal vaginal discharge  ----- Message from Vikki Brownlee sent at 9/6/2021  9:44 AM EDT -----  "Im having abnormal vaginal discharge "

## 2021-09-10 ENCOUNTER — OFFICE VISIT (OUTPATIENT)
Dept: OBGYN CLINIC | Facility: MEDICAL CENTER | Age: 31
End: 2021-09-10
Payer: COMMERCIAL

## 2021-09-10 VITALS
HEIGHT: 63 IN | BODY MASS INDEX: 31.01 KG/M2 | SYSTOLIC BLOOD PRESSURE: 116 MMHG | HEART RATE: 68 BPM | WEIGHT: 175 LBS | DIASTOLIC BLOOD PRESSURE: 79 MMHG

## 2021-09-10 DIAGNOSIS — M25.561 BILATERAL CHRONIC KNEE PAIN: ICD-10-CM

## 2021-09-10 DIAGNOSIS — G89.29 CHRONIC BILATERAL THORACIC BACK PAIN: Primary | ICD-10-CM

## 2021-09-10 DIAGNOSIS — G89.29 BILATERAL CHRONIC KNEE PAIN: ICD-10-CM

## 2021-09-10 DIAGNOSIS — M54.6 CHRONIC BILATERAL THORACIC BACK PAIN: Primary | ICD-10-CM

## 2021-09-10 DIAGNOSIS — M25.562 BILATERAL CHRONIC KNEE PAIN: ICD-10-CM

## 2021-09-10 PROCEDURE — 99213 OFFICE O/P EST LOW 20 MIN: CPT | Performed by: EMERGENCY MEDICINE
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
## 2021-09-13 ENCOUNTER — OFFICE VISIT (OUTPATIENT)
Dept: OBGYN CLINIC | Facility: MEDICAL CENTER | Age: 31
End: 2021-09-13
Payer: COMMERCIAL

## 2021-09-13 VITALS — BODY MASS INDEX: 31.01 KG/M2 | HEIGHT: 63 IN | WEIGHT: 175 LBS

## 2021-09-13 DIAGNOSIS — B37.9 YEAST INFECTION: Primary | ICD-10-CM

## 2021-09-13 DIAGNOSIS — B96.89 BV (BACTERIAL VAGINOSIS): ICD-10-CM

## 2021-09-13 DIAGNOSIS — N76.0 BV (BACTERIAL VAGINOSIS): ICD-10-CM

## 2021-09-13 PROCEDURE — 99213 OFFICE O/P EST LOW 20 MIN: CPT | Performed by: OBSTETRICS & GYNECOLOGY

## 2021-09-13 RX ORDER — METRONIDAZOLE 500 MG/1
500 TABLET ORAL EVERY 12 HOURS SCHEDULED
Qty: 10 TABLET | Refills: 0 | Status: SHIPPED | OUTPATIENT
Start: 2021-09-13 | End: 2021-09-18

## 2021-09-13 RX ORDER — FLUCONAZOLE 150 MG/1
150 TABLET ORAL DAILY
Qty: 2 TABLET | Refills: 0 | Status: SHIPPED | OUTPATIENT
Start: 2021-09-13 | End: 2021-09-15

## 2021-09-13 NOTE — PROGRESS NOTES
1   BV     Treated for documented BV 8/23   she reports that she took all the meds and not better then she noticed after she had white thick discharge that was very itchy     After scooping it out over the few days it cleared up but still itchy and some odor     On exam today there is BV again but pt reports the itching and very uncomfortable    Pt will take the Diflucan for the itching and after a day or two take the flagyl        Follow up after treatment

## 2021-09-17 ENCOUNTER — OFFICE VISIT (OUTPATIENT)
Dept: OBGYN CLINIC | Facility: MEDICAL CENTER | Age: 31
End: 2021-09-17

## 2021-09-17 VITALS
SYSTOLIC BLOOD PRESSURE: 110 MMHG | BODY MASS INDEX: 31.36 KG/M2 | HEIGHT: 63 IN | WEIGHT: 177 LBS | DIASTOLIC BLOOD PRESSURE: 70 MMHG

## 2021-09-17 DIAGNOSIS — N76.0 BV (BACTERIAL VAGINOSIS): Primary | ICD-10-CM

## 2021-09-17 DIAGNOSIS — B96.89 BV (BACTERIAL VAGINOSIS): Primary | ICD-10-CM

## 2021-09-17 DIAGNOSIS — N87.9 CERVICAL DYSPLASIA: ICD-10-CM

## 2021-09-17 PROCEDURE — PREOP: Performed by: STUDENT IN AN ORGANIZED HEALTH CARE EDUCATION/TRAINING PROGRAM

## 2021-09-17 NOTE — PROGRESS NOTES
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    - She opts to proceed with LEEP  - We reviewed the risks, benefits, and alternatives to LEEP in detail  We dicussed the steps of the procedure  We reviewed the small but increased risk of PPROM/ labor in future pregnancies  We reviewed her history of spontaneous  birth at 29 and 42 weeks  - We reviewed postop instructions to avoid intercourse until cervix heals  We reviewed that LEEP can cause abnormal discharge postop as the cervix is healing  Diagnoses and all orders for this visit:    BV (bacterial vaginosis)  -     clindamycin (CLEOCIN) 100 MG vaginal suppository; Insert 1 suppository (100 mg total) into the vagina daily at bedtime for 3 days    Cervical dysplasia          Subjective:   Rajni Hernandez is a 27 y o  [id number] female  CC: Preop    HPI: Jesus Waters presents for preop for LEEP planned for 21 for 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 after ASC-H HPV OHR positive on LEEP       ROS: Review of Systems   Constitutional: Negative for chills and fever  Respiratory: Negative for cough and shortness of breath  Cardiovascular: Negative for chest pain and leg swelling  Gastrointestinal: Negative for abdominal pain, nausea and vomiting  Genitourinary: Negative for dysuria, frequency and urgency  Neurological: Negative for dizziness, light-headedness and headaches         PFSH: The following portions of the patient's history were reviewed and updated as appropriate: allergies, current medications, past family history, past medical history, obstetric history, gynecologic history, past social history, past surgical history and problem list        Objective:  /70   Ht 5' 3" (1 6 m)   Wt 80 3 kg (177 lb)   LMP  (LMP Unknown)   BMI 31 35 kg/m²    Physical Exam  Constitutional:       Appearance: Normal appearance  HENT:      Head: Normocephalic and atraumatic  Mouth/Throat:      Mouth: Mucous membranes are moist       Pharynx: Oropharynx is clear  No oropharyngeal exudate  Cardiovascular:      Rate and Rhythm: Normal rate and regular rhythm  Pulses: Normal pulses  Heart sounds: Normal heart sounds  No murmur heard  No friction rub  No gallop  Pulmonary:      Effort: Pulmonary effort is normal  No respiratory distress  Breath sounds: Normal breath sounds  No wheezing, rhonchi or rales  Abdominal:      General: Abdomen is flat  There is no distension  Palpations: Abdomen is soft  There is no mass  Tenderness: There is no abdominal tenderness  Hernia: No hernia is present  Skin:     General: Skin is warm and dry  Neurological:      General: No focal deficit present  Mental Status: She is alert and oriented to person, place, and time     Psychiatric:         Mood and Affect: Mood normal          Behavior: Behavior normal          Arnie Pires MD  [address] [place] [address]  9/17/2021 2:55 PM

## 2021-09-17 NOTE — H&P (VIEW-ONLY)
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    - She opts to proceed with LEEP  - We reviewed the risks, benefits, and alternatives to LEEP in detail  We dicussed the steps of the procedure  We reviewed the small but increased risk of PPROM/ labor in future pregnancies  We reviewed her history of spontaneous  birth at 29 and 42 weeks  - We reviewed postop instructions to avoid intercourse until cervix heals  We reviewed that LEEP can cause abnormal discharge postop as the cervix is healing  Diagnoses and all orders for this visit:    BV (bacterial vaginosis)  -     clindamycin (CLEOCIN) 100 MG vaginal suppository; Insert 1 suppository (100 mg total) into the vagina daily at bedtime for 3 days    Cervical dysplasia          Subjective:   Sunil Thpaa is a 27 y o  [id number] female  CC: Preop    HPI: Yazmin Moore presents for preop for LEEP planned for 21 for 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 after ASC-H HPV OHR positive on LEEP       ROS: Review of Systems   Constitutional: Negative for chills and fever  Respiratory: Negative for cough and shortness of breath  Cardiovascular: Negative for chest pain and leg swelling  Gastrointestinal: Negative for abdominal pain, nausea and vomiting  Genitourinary: Negative for dysuria, frequency and urgency  Neurological: Negative for dizziness, light-headedness and headaches         PFSH: The following portions of the patient's history were reviewed and updated as appropriate: allergies, current medications, past family history, past medical history, obstetric history, gynecologic history, past social history, past surgical history and problem list        Objective:  /70   Ht 5' 3" (1 6 m)   Wt 80 3 kg (177 lb)   LMP  (LMP Unknown)   BMI 31 35 kg/m²    Physical Exam  Constitutional:       Appearance: Normal appearance  HENT:      Head: Normocephalic and atraumatic  Mouth/Throat:      Mouth: Mucous membranes are moist       Pharynx: Oropharynx is clear  No oropharyngeal exudate  Cardiovascular:      Rate and Rhythm: Normal rate and regular rhythm  Pulses: Normal pulses  Heart sounds: Normal heart sounds  No murmur heard  No friction rub  No gallop  Pulmonary:      Effort: Pulmonary effort is normal  No respiratory distress  Breath sounds: Normal breath sounds  No wheezing, rhonchi or rales  Abdominal:      General: Abdomen is flat  There is no distension  Palpations: Abdomen is soft  There is no mass  Tenderness: There is no abdominal tenderness  Hernia: No hernia is present  Skin:     General: Skin is warm and dry  Neurological:      General: No focal deficit present  Mental Status: She is alert and oriented to person, place, and time     Psychiatric:         Mood and Affect: Mood normal          Behavior: Behavior normal          Opal Ramachandran MD  [address] [place] [address]  9/17/2021 2:55 PM

## 2021-09-17 NOTE — ASSESSMENT & PLAN NOTE
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

## 2021-09-20 RX ORDER — DOXYCYCLINE HYCLATE 50 MG/1
324 CAPSULE, GELATIN COATED ORAL
COMMUNITY

## 2021-09-20 RX ORDER — MELATONIN
1000 DAILY
COMMUNITY
End: 2022-03-07

## 2021-09-20 NOTE — PRE-PROCEDURE INSTRUCTIONS
Pre-Surgery Instructions:   Medication Instructions    cholecalciferol (VITAMIN D3) 1,000 units tablet Instructed patient per Anesthesia Guidelines   ferrous gluconate (FERGON) 324 mg tablet Instructed patient per Anesthesia Guidelines  Patient has no medications to take morning of surgery

## 2021-09-21 ENCOUNTER — ANESTHESIA EVENT (OUTPATIENT)
Dept: PERIOP | Facility: HOSPITAL | Age: 31
End: 2021-09-21
Payer: COMMERCIAL

## 2021-09-22 ENCOUNTER — ANESTHESIA (OUTPATIENT)
Dept: PERIOP | Facility: HOSPITAL | Age: 31
End: 2021-09-22
Payer: COMMERCIAL

## 2021-09-22 ENCOUNTER — HOSPITAL ENCOUNTER (OUTPATIENT)
Facility: HOSPITAL | Age: 31
Setting detail: OUTPATIENT SURGERY
Discharge: HOME | End: 2021-09-22
Attending: STUDENT IN AN ORGANIZED HEALTH CARE EDUCATION/TRAINING PROGRAM | Admitting: STUDENT IN AN ORGANIZED HEALTH CARE EDUCATION/TRAINING PROGRAM
Payer: COMMERCIAL

## 2021-09-22 VITALS
HEIGHT: 63 IN | WEIGHT: 177 LBS | OXYGEN SATURATION: 100 % | HEART RATE: 66 BPM | DIASTOLIC BLOOD PRESSURE: 68 MMHG | TEMPERATURE: 98.2 F | SYSTOLIC BLOOD PRESSURE: 114 MMHG | RESPIRATION RATE: 12 BRPM | BODY MASS INDEX: 31.36 KG/M2

## 2021-09-22 DIAGNOSIS — N87.9 DYSPLASIA OF CERVIX: ICD-10-CM

## 2021-09-22 PROBLEM — Z98.890 S/P LEEP: Status: ACTIVE | Noted: 2021-09-22

## 2021-09-22 PROBLEM — F17.210 LIGHT CIGARETTE SMOKER: Status: ACTIVE | Noted: 2021-09-22

## 2021-09-22 LAB
BASOPHILS # BLD AUTO: 0.02 THOUSANDS/ΜL (ref 0–0.1)
BASOPHILS NFR BLD AUTO: 0 % (ref 0–1)
EOSINOPHIL # BLD AUTO: 0.2 THOUSAND/ΜL (ref 0–0.61)
EOSINOPHIL NFR BLD AUTO: 2 % (ref 0–6)
ERYTHROCYTE [DISTWIDTH] IN BLOOD BY AUTOMATED COUNT: 12.1 % (ref 11.6–15.1)
HCG SERPL QL: NEGATIVE
HCT VFR BLD AUTO: 38.1 % (ref 34.8–46.1)
HGB BLD-MCNC: 13 G/DL (ref 11.5–15.4)
IMM GRANULOCYTES # BLD AUTO: 0.01 THOUSAND/UL (ref 0–0.2)
IMM GRANULOCYTES NFR BLD AUTO: 0 % (ref 0–2)
LYMPHOCYTES # BLD AUTO: 2.79 THOUSANDS/ΜL (ref 0.6–4.47)
LYMPHOCYTES NFR BLD AUTO: 33 % (ref 14–44)
MCH RBC QN AUTO: 32.3 PG (ref 26.8–34.3)
MCHC RBC AUTO-ENTMCNC: 34.1 G/DL (ref 31.4–37.4)
MCV RBC AUTO: 95 FL (ref 82–98)
MONOCYTES # BLD AUTO: 0.59 THOUSAND/ΜL (ref 0.17–1.22)
MONOCYTES NFR BLD AUTO: 7 % (ref 4–12)
NEUTROPHILS # BLD AUTO: 4.75 THOUSANDS/ΜL (ref 1.85–7.62)
NEUTS SEG NFR BLD AUTO: 58 % (ref 43–75)
NRBC BLD AUTO-RTO: 0 /100 WBCS
PLATELET # BLD AUTO: 240 THOUSANDS/UL (ref 149–390)
PMV BLD AUTO: 11.1 FL (ref 8.9–12.7)
RBC # BLD AUTO: 4.03 MILLION/UL (ref 3.81–5.12)
WBC # BLD AUTO: 8.36 THOUSAND/UL (ref 4.31–10.16)

## 2021-09-22 PROCEDURE — 88307 TISSUE EXAM BY PATHOLOGIST: CPT | Performed by: PATHOLOGY

## 2021-09-22 PROCEDURE — 84703 CHORIONIC GONADOTROPIN ASSAY: CPT | Performed by: STUDENT IN AN ORGANIZED HEALTH CARE EDUCATION/TRAINING PROGRAM

## 2021-09-22 PROCEDURE — 57522 CONIZATION OF CERVIX: CPT | Performed by: STUDENT IN AN ORGANIZED HEALTH CARE EDUCATION/TRAINING PROGRAM

## 2021-09-22 PROCEDURE — 88344 IMHCHEM/IMCYTCHM EA MLT ANTB: CPT | Performed by: PATHOLOGY

## 2021-09-22 PROCEDURE — 85025 COMPLETE CBC W/AUTO DIFF WBC: CPT | Performed by: STUDENT IN AN ORGANIZED HEALTH CARE EDUCATION/TRAINING PROGRAM

## 2021-09-22 RX ORDER — ONDANSETRON 2 MG/ML
INJECTION INTRAMUSCULAR; INTRAVENOUS AS NEEDED
Status: DISCONTINUED | OUTPATIENT
Start: 2021-09-22 | End: 2021-09-22

## 2021-09-22 RX ORDER — SODIUM CHLORIDE 9 MG/ML
125 INJECTION, SOLUTION INTRAVENOUS CONTINUOUS
Status: DISCONTINUED | OUTPATIENT
Start: 2021-09-22 | End: 2021-09-22 | Stop reason: HOSPADM

## 2021-09-22 RX ORDER — PROPOFOL 10 MG/ML
INJECTION, EMULSION INTRAVENOUS AS NEEDED
Status: DISCONTINUED | OUTPATIENT
Start: 2021-09-22 | End: 2021-09-22

## 2021-09-22 RX ORDER — IBUPROFEN 600 MG/1
600 TABLET ORAL EVERY 6 HOURS PRN
Status: DISCONTINUED | OUTPATIENT
Start: 2021-09-22 | End: 2021-09-22 | Stop reason: HOSPADM

## 2021-09-22 RX ORDER — FENTANYL CITRATE/PF 50 MCG/ML
50 SYRINGE (ML) INJECTION
Status: DISCONTINUED | OUTPATIENT
Start: 2021-09-22 | End: 2021-09-22 | Stop reason: HOSPADM

## 2021-09-22 RX ORDER — DEXAMETHASONE SODIUM PHOSPHATE 4 MG/ML
INJECTION, SOLUTION INTRA-ARTICULAR; INTRALESIONAL; INTRAMUSCULAR; INTRAVENOUS; SOFT TISSUE AS NEEDED
Status: DISCONTINUED | OUTPATIENT
Start: 2021-09-22 | End: 2021-09-22

## 2021-09-22 RX ORDER — MIDAZOLAM HYDROCHLORIDE 2 MG/2ML
INJECTION, SOLUTION INTRAMUSCULAR; INTRAVENOUS AS NEEDED
Status: DISCONTINUED | OUTPATIENT
Start: 2021-09-22 | End: 2021-09-22

## 2021-09-22 RX ORDER — MAGNESIUM HYDROXIDE 1200 MG/15ML
LIQUID ORAL AS NEEDED
Status: DISCONTINUED | OUTPATIENT
Start: 2021-09-22 | End: 2021-09-22 | Stop reason: HOSPADM

## 2021-09-22 RX ORDER — ONDANSETRON 2 MG/ML
4 INJECTION INTRAMUSCULAR; INTRAVENOUS ONCE AS NEEDED
Status: DISCONTINUED | OUTPATIENT
Start: 2021-09-22 | End: 2021-09-22 | Stop reason: HOSPADM

## 2021-09-22 RX ORDER — LIDOCAINE HYDROCHLORIDE 10 MG/ML
INJECTION, SOLUTION EPIDURAL; INFILTRATION; INTRACAUDAL; PERINEURAL AS NEEDED
Status: DISCONTINUED | OUTPATIENT
Start: 2021-09-22 | End: 2021-09-22 | Stop reason: HOSPADM

## 2021-09-22 RX ORDER — ONDANSETRON 2 MG/ML
4 INJECTION INTRAMUSCULAR; INTRAVENOUS EVERY 6 HOURS PRN
Status: DISCONTINUED | OUTPATIENT
Start: 2021-09-22 | End: 2021-09-22 | Stop reason: HOSPADM

## 2021-09-22 RX ORDER — FENTANYL CITRATE 50 UG/ML
INJECTION, SOLUTION INTRAMUSCULAR; INTRAVENOUS AS NEEDED
Status: DISCONTINUED | OUTPATIENT
Start: 2021-09-22 | End: 2021-09-22

## 2021-09-22 RX ORDER — LIDOCAINE HYDROCHLORIDE 20 MG/ML
INJECTION, SOLUTION EPIDURAL; INFILTRATION; INTRACAUDAL; PERINEURAL AS NEEDED
Status: DISCONTINUED | OUTPATIENT
Start: 2021-09-22 | End: 2021-09-22

## 2021-09-22 RX ORDER — KETOROLAC TROMETHAMINE 30 MG/ML
INJECTION, SOLUTION INTRAMUSCULAR; INTRAVENOUS AS NEEDED
Status: DISCONTINUED | OUTPATIENT
Start: 2021-09-22 | End: 2021-09-22

## 2021-09-22 RX ORDER — ACETAMINOPHEN 325 MG/1
650 TABLET ORAL EVERY 6 HOURS PRN
Status: DISCONTINUED | OUTPATIENT
Start: 2021-09-22 | End: 2021-09-22 | Stop reason: HOSPADM

## 2021-09-22 RX ORDER — METRONIDAZOLE 500 MG/1
500 TABLET ORAL EVERY 12 HOURS SCHEDULED
COMMUNITY
End: 2022-03-07

## 2021-09-22 RX ADMIN — FENTANYL CITRATE 25 MCG: 50 INJECTION INTRAMUSCULAR; INTRAVENOUS at 13:46

## 2021-09-22 RX ADMIN — DEXAMETHASONE SODIUM PHOSPHATE 8 MG: 4 INJECTION INTRA-ARTICULAR; INTRALESIONAL; INTRAMUSCULAR; INTRAVENOUS; SOFT TISSUE at 13:34

## 2021-09-22 RX ADMIN — LIDOCAINE HYDROCHLORIDE 100 MG: 20 INJECTION, SOLUTION EPIDURAL; INFILTRATION; INTRACAUDAL; PERINEURAL at 13:30

## 2021-09-22 RX ADMIN — PROPOFOL 200 MG: 10 INJECTION, EMULSION INTRAVENOUS at 13:30

## 2021-09-22 RX ADMIN — KETOROLAC TROMETHAMINE 30 MG: 30 INJECTION, SOLUTION INTRAMUSCULAR at 13:57

## 2021-09-22 RX ADMIN — FENTANYL CITRATE 50 MCG: 50 INJECTION INTRAMUSCULAR; INTRAVENOUS at 15:17

## 2021-09-22 RX ADMIN — SODIUM CHLORIDE 125 ML/HR: 0.9 INJECTION, SOLUTION INTRAVENOUS at 12:36

## 2021-09-22 RX ADMIN — SODIUM CHLORIDE 125 ML/HR: 0.9 INJECTION, SOLUTION INTRAVENOUS at 15:24

## 2021-09-22 RX ADMIN — ACETAMINOPHEN 650 MG: 325 TABLET, FILM COATED ORAL at 16:10

## 2021-09-22 RX ADMIN — ONDANSETRON 4 MG: 2 INJECTION INTRAMUSCULAR; INTRAVENOUS at 13:34

## 2021-09-22 RX ADMIN — MIDAZOLAM 2 MG: 1 INJECTION INTRAMUSCULAR; INTRAVENOUS at 13:25

## 2021-09-22 RX ADMIN — FENTANYL CITRATE 25 MCG: 50 INJECTION INTRAMUSCULAR; INTRAVENOUS at 13:42

## 2021-09-22 NOTE — INTERVAL H&P NOTE
H&P reviewed  After examining the patient I find no changes in the patients condition since the H&P had been written      Vitals:    09/22/21 1218   BP: 114/68   Pulse: 69   Resp: 16   Temp: 98 3 °F (36 8 °C)   SpO2: 99%       Lit Alvarez MD  [address] [place] [address]  9/22/2021 1:23 PM

## 2021-09-22 NOTE — OP NOTE
OPERATIVE REPORT  PATIENT NAME: Meche Frias    :  1990  MRN: [id number]  Pt Location: [facility] OR ROOM 06    SURGERY DATE: 2021    Surgeon(s) and Role:     Walter Gonzales MD - Primary     * Pedro Pablo Jasso MD - Assisting    Preop Diagnosis:  Dysplasia of cervix [N87 9]    Post-Op Diagnosis Codes: * Dysplasia of cervix [N87 9]    Procedure(s) (LRB):  BIOPSY LEEP CERVIX (N/A)    Specimen(s):  ID Type Source Tests Collected by Time Destination   1 : Exo cervix Tissue Cervix TISSUE EXAM Joseluis Fail  Vicki Gonzales MD 2021 1352    2 : Endo cervix Tissue Cervix TISSUE EXAM Joseluis Fail  Vicki Gonzales MD 2021 1357        Estimated Blood Loss:   Minimal    Drains:  * No LDAs found *    Anesthesia Type:   General LMA    Operative Indications:  Dysplasia of cervix [N87 9]    Operative Findings:  1  Normal appearing external genitalia   2  Normal appearing vaginal mucosa   3  Parous appearing cervix without any notable lesions     Complications:   None    Procedure and Technique:  Brief History   Ms Meche Frias is a 27y o  year old G 5 P 2214 with a Pap smear showing endocervical curetting significant for dysplastic mucosa with at least LSIL and possibly HSIL and a colposcopy showing endocervical curettings significant for HSIL  Surgical risks: The patient was informed of all the risks and benefits of a loop electrosurgical excision procedure  Risks included but were not limited to bleeding, infection, injury to the vulva, vagina, cervix, uterine perforation, or negative effects on future pregnancy outcomes  The patient expressed understanding the risks involved, all portions were answered, the patient was consented to the procedure  Description of Procedure    Patient was taken to the operating room were a time out was performed to confirm correct patient and correct procedure   General LMA anesthesia (LMA) was administered and the patient was positioned on the OR table in the dorsal lithotomy position  All pressure points were padded and a viri hugger was placed to maintain control of core body temperature  The patient was prepped and draped in the usual sterile fashion  Operative Technique    A coated speculum was inserted into the vagina and used to visualize the cervix  Lidocaine with epinephrine was injected circumferentially on the face of the cervix and blanching was noted  A loop electrode (20mm x 12mm) was selected and used to excise the lesion using 60/80 cut/cautery setting  A second loop electrode measuring 10mm X 10mm was utilized to collect a deeper sample of the endocervical canal given the colposcopy findings  The cervical bed was cauterized using a ball tip Bovie electrocautery, taking care to avoid the cervical canal Good hemostasis was confirmed at the conclusion of the procedure  Coated speculum was removed from vagina  At the conclusion of the procedure, all needle, sponge, and instrument counts were noted to be correct x2  Patient tolerated the procedure well and was transferred to PACU in stable condition prior to discharge with follow up in 1-2 weeks  Dr Sury Phillips was present and participated in all key portions of the case        Patient Disposition:  PACU     SIGNATURE: Cliff Alegria MD  DATE: September 22, 2021  TIME: 3:17 PM

## 2021-09-22 NOTE — ANESTHESIA PREPROCEDURE EVALUATION
Procedure:  BIOPSY LEEP CERVIX (N/A Cervix)    Relevant Problems   MUSCULOSKELETAL   (+) Myofascial pain syndrome      NEURO/PSYCH   (+) Anxiety   (+) Chronic pain disorder   (+) Myofascial pain syndrome      PULMONARY   (+) Light cigarette smoker      Other   (+) Cervical dysplasia   (+) Occipital neuralgia of left side        Physical Exam    Airway    Mallampati score: I  TM Distance: >3 FB  Neck ROM: full     Dental   Comment: Horizontal tongue piercing ("does not come out"),     Cardiovascular  Rhythm: regular, Rate: normal, Cardiovascular exam normal    Pulmonary  Pulmonary exam normal Breath sounds clear to auscultation,     Other Findings        Anesthesia Plan  ASA Score- 2     Anesthesia Type- general with ASA Monitors  Additional Monitors:   Airway Plan:           Plan Factors-    Patient summary reviewed  Patient is a current smoker  Patient instructed to abstain from smoking on day of procedure  Patient did not smoke on day of surgery  Induction- intravenous  Postoperative Plan-     Informed Consent- Anesthetic plan and risks discussed with patient

## 2021-09-22 NOTE — DISCHARGE INSTRUCTIONS
Loop Electrosurgical Excision Procedure   WHAT YOU NEED TO KNOW:   A loop electrosurgical excision procedure (LEEP) is used to remove abnormal tissue from your cervix or vagina  Your cervix is the opening of your uterus  Your healthcare provider will use a small wire loop that is heated by an electrical current to remove the tissue  DISCHARGE INSTRUCTIONS:   Medicines: You may need any of the following:  · Acetaminophen  decreases pain  It is available without a doctor's order  Ask how much to take and how often to take it  Follow directions  Acetaminophen can cause liver damage if not taken correctly  · NSAIDs  decrease pain and swelling  This medicine is available without a doctor's order  This medicine can cause stomach bleeding or kidney problems  If you take blood thinner medicine, always ask your healthcare provider if NSAIDs are safe for you  Always read the medicine label and follow the directions on it before you use this medicine  · Take your medicine as directed  Contact your healthcare provider if you think your medicine is not helping or if you have side effects  Tell him if you are allergic to any medicine  Keep a list of the medicines, vitamins, and herbs you take  Include the amounts, and when and why you take them  Bring the list or the pill bottles to follow-up visits  Carry your medicine list with you in case of an emergency  Follow up with your healthcare provider or gynecologist as directed:  Write down your questions so you remember to ask them during your visits  Rest:  Rest when you feel it is needed  Slowly start to do more each day  Return to your daily activities as directed  Vaginal care: It is normal to have mild cramping, spotting, or discharge for several days after your procedure  You may also have a thin, watery discharge for up to 4 weeks after your procedure  Use a clean sanitary pad as needed   Do not  use tampons, douche, or have sex until your healthcare provider or gynecologist says that it is okay  Bathing:  Do not take a bath or use a hot tub for 2 weeks after your procedure, or as directed by your healthcare provider or gynecologist  Erickaalbert Duncan may shower during this time  Contact your healthcare provider or gynecologist if:   · You have a fever or chills  · You have nausea or are vomiting  · You have blood in your urine  · Your pad becomes soaked with blood  · You have foul-smelling drainage from your vagina  · You have pain when you urinate or have sex  · You have questions or concerns about your condition or care  Seek care immediately or call 911 if:   · You have heavy bleeding from your vagina  · You have severe abdominal or vaginal pain that does not go away, even after you take pain medicine  · You are urinating less than before your procedure  © 2016 [employer] is for End User's use only and may not be sold, redistributed or otherwise used for commercial purposes  All illustrations and images included in CareNotes® are the copyrighted property of [employer]  or Andrzej Anderson  The above information is an  only  It is not intended as medical advice for individual conditions or treatments  Talk to your doctor, nurse or pharmacist before following any medical regimen to see if it is safe and effective for you

## 2021-09-22 NOTE — ANESTHESIA POSTPROCEDURE EVALUATION
Post-Op Assessment Note    CV Status:  Stable    Pain management: adequate     Mental Status:  Alert and awake   Hydration Status:  Euvolemic   PONV Controlled:  Controlled   Airway Patency:  Patent      Post Op Vitals Reviewed: Yes      Staff: Anesthesiologist         No complications documented      /69 (09/22/21 1408)    Temp 97 8 °F (36 6 °C) (09/22/21 1408)    Pulse 68 (09/22/21 1408)   Resp 16 (09/22/21 1408)    SpO2 98 % (09/22/21 1408)

## 2021-09-30 ENCOUNTER — OFFICE VISIT (OUTPATIENT)
Dept: PAIN MEDICINE | Facility: MEDICAL CENTER | Age: 31
End: 2021-09-30
Payer: COMMERCIAL

## 2021-09-30 VITALS
SYSTOLIC BLOOD PRESSURE: 118 MMHG | WEIGHT: 176 LBS | HEIGHT: 63 IN | HEART RATE: 74 BPM | BODY MASS INDEX: 31.18 KG/M2 | DIASTOLIC BLOOD PRESSURE: 82 MMHG

## 2021-09-30 DIAGNOSIS — G89.4 CHRONIC PAIN DISORDER: Primary | ICD-10-CM

## 2021-09-30 DIAGNOSIS — M79.18 MYOFASCIAL PAIN SYNDROME: ICD-10-CM

## 2021-09-30 DIAGNOSIS — M62.830 SPASM OF BACK MUSCLES: ICD-10-CM

## 2021-09-30 PROCEDURE — 99214 OFFICE O/P EST MOD 30 MIN: CPT | Performed by: NURSE PRACTITIONER

## 2021-09-30 RX ORDER — PREGABALIN 75 MG/1
75 CAPSULE ORAL 2 TIMES DAILY
Qty: 60 CAPSULE | Refills: 1 | Status: SHIPPED | OUTPATIENT
Start: 2021-09-30 | End: 2022-03-07

## 2021-09-30 RX ORDER — CYCLOBENZAPRINE HCL 5 MG
5 TABLET ORAL 3 TIMES DAILY PRN
Qty: 90 TABLET | Refills: 0 | Status: SHIPPED | OUTPATIENT
Start: 2021-09-30 | End: 2022-03-03 | Stop reason: SDUPTHER
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
## 2021-09-30 NOTE — PATIENT INSTRUCTIONS
Hip Bursitis Exercises   AMBULATORY CARE:   Hip bursitis exercises  help strengthen the muscles in your hip and keep the joint flexible  Strong muscles can help reduce pain, prevent injury, and keep the joint stable  The exercises can also help increase the range of motion in your hip joint  What you need to know about exercise safety:   · Move slowly and smoothly  Avoid fast or jerky motions  This will help prevent an injury  · Breathe normally  Do not hold your breath  It is important to breathe in and out so you do not tense up during exercise  Tension could prevent you from moving your joint in a full range of motion  · Do the exercises and stretches on both legs  Do this so both hips remain strong and flexible  · Stop if you feel sharp pain or an increase in pain  Contact your healthcare provider or physical therapist  It is normal to feel some discomfort during exercise  Regular exercise will help decrease your discomfort over time  · Warm up before you stretch and exercise  Walk or ride a stationary bike for 5 to 10 minutes  How to do hip stretches: Your healthcare provider or physical therapist will tell you how many times to do each stretch  Do the stretch on both sides before you move to the next stretch  · Standing iliotibial band stretch:  Stand with the leg on your injured side behind your other leg  Bend sideways toward the side that is not injured  Stop when you feel a stretch in your outer hip  Hold for 5 to 10 seconds  Then return to the starting position  · Lying iliotibial band stretch:  Lie on your back  Bend the knee on your injured side toward your chest  Place your hands on the outside of your knee and thigh  Slowly pull the knee across your body  Stop when you feel a stretch in your hip and outer thigh  Hold for 5 to 10 seconds  Return your leg to the starting position  · Hip stretch:  Lie on your back with both legs straight and on the [facility] the knee on your injured side toward your chest until you can reach your lower leg  Place both hands on your shin and pull your knee toward your chest  Hold for 5 to 10 seconds  Return your leg to the starting position  · Knee to chest:  Lie on your back with both knees bent and feet flat on the floor  Bend the knee on your injured side toward your chest until you can reach your lower leg  Place both hands on your shin and pull your knee toward your chest  Hold for 5 to 10 seconds  Return your leg to the starting position  · Internal hip rotator stretch:  You will do this exercise on a table  Lie on your side with the injured hip on top  You may be told to keep a pillow between your thighs  Move the top leg so the foot hangs below the edge of the table  Rotate your hip to raise your foot in the opposite direction of the bottom shoulder  Raise your foot as high as you can so you feel a stretch in the back of your thigh  Hold for 5 seconds  Then slowly lower your foot to the starting position  · External hip rotator stretch:  You will do this exercise on a table  Lie on your side with the injured hip on the bottom  You do not need a pillow between your thighs for this exercise  Move the bottom leg so the foot is off the edge of the table  Rotate your hip to lift the foot in the opposite direction of the bottom shoulder  Raise your foot as high as you can so you feel a stretch in your buttock  Hold for 5 seconds  Then slowly lower your foot to the starting position  · Kneeling hip flexor stretch:  Kneel on your knee on the injured side  Place the foot of your other leg on the floor so the knee is bent  Put both hands on top of your thigh  Keep your back straight and abdominal muscles tight  Lean forward until you feel a stretch in your other thigh  Hold the stretch for 10 seconds  Return to the starting position  How to do hip strengthening exercises:   Your healthcare provider or physical therapist will tell you how many times to do each exercise  Do the exercise on both sides before you move to the next exercise  · Straight leg lift to the side: This may also be called hip abduction  Lie on your side with straight legs, with the injured hip on top  Slowly raise your top leg toward the ceiling as high as you can  Keep your foot pointed  Hold for 5 seconds  Then slowly lower your leg to the starting position  · Inner thigh lift: This may also be called hip adduction  Lie on your side with straight legs, with the injured hip on the bottom  Cross your top leg over your bottom leg  Put the foot of your top leg on the floor in front of you  Raise your bottom leg until it touches the top leg  Hold for 5 seconds  Then slowly lower the leg to the floor  · Clam exercise:  Lie on your side so your injured side is on top  Bend your knees  Keep your heels together during this exercise  Slowly raise your top knee toward the ceiling  Then lower your leg so your knees are together  Call your doctor if:   · You have sharp pain during exercise or at rest     · You have questions or concerns about the stretches or exercises  © Copyright [employer] 2021 Information is for End User's use only and may not be sold, redistributed or otherwise used for commercial purposes  All illustrations and images included in CareNotes® are the copyrighted property of A D A M [employer] Inc  or [facility] Sophie Jessica   The above information is an  only  It is not intended as medical advice for individual conditions or treatments  Talk to your doctor, nurse or pharmacist before following any medical regimen to see if it is safe and effective for you  Core Strengthening Exercises   AMBULATORY CARE:   What you need to know about core strengthening exercises: Your core includes the muscles of your lower back, hip, pelvis, and abdomen  Core strengthening exercises help heal and strengthen these muscles  This helps prevent another injury, and keeps your pelvis, spine, and hips in the correct position  Contact your healthcare provider if:   · You have sharp or worsening pain during exercise or at rest     · You have questions or concerns about your shoulder exercises  Safety tips:  Talk to your healthcare provider before you start an exercise program  A physical therapist can teach you how to do core strengthening exercises safely  · Do the exercises on a mat or firm surface  A firm surface will support your spine and prevent low back pain  Do not do these exercises on a bed  · Move slowly and smoothly  Avoid fast or jerky motions  · Stop if you feel pain  Core exercises should not be painful  Stop if you feel pain  · Breathe normally during core exercises  Do not hold your breath  This may cause an increase in blood pressure and prevent muscle strengthening  Your healthcare provider will tell you when to inhale and exhale during the exercise  · Begin all of your exercises with abdominal bracing  Abdominal bracing helps warm up your core muscles  You can also practice abdominal bracing throughout the day  Lie on your back with your knees bent and feet flat on the floor  Place your arms in a relaxed position beside your body  Tighten your abdominal muscles  Pull your belly button in and up toward your spine  Hold for 5 seconds  Relax your muscles  Repeat 10 times  Core strengthening exercises: Your healthcare provider will tell you how often to do these exercises  The provider will also tell you how many repetitions of each exercise you should do  Hold each exercise for 5 seconds or as directed  As you get stronger, increase your hold to 10 to 15 seconds  You can do some of these exercises on a stability ball, or with a weight   Ask your healthcare provider how to use a stability ball or weight for these exercises:  · Bridging:  Lie on your back with your knees bent and feet flat on the floor  Rest your arms at your side  Tighten your buttocks, and then lift your hips 1 inch off the floor  Hold for 5 seconds  When you can do this exercise without pain for 10 seconds, increase the distance you lift your hips  A good goal is to be able to lift your hips so that your shoulders, hips, and knees are in a straight line  · Dead bug:  Lie on your back with your knees bent and feet flat on the floor  Place your arms in a relaxed position beside your body  Begin with abdominal bracing  Next, raise one leg, keeping your knee bent  Hold for 5 seconds  Repeat with the other leg  When you can do this exercise without pain for 10 to 15 seconds, you may raise one straight leg and hold  Repeat with the other leg  · Quadruped:  Place your hands and knees on the floor  Keep your wrists directly below your shoulders and your knees directly below your hips  Pull your belly button in toward your spine  Do not flatten or arch your back  Tighten your abdominal muscles below your belly button  Hold for 5 seconds  When you can do this exercise without pain for 10 to 15 seconds, you may extend one arm and hold  Repeat on the other side  · Side bridge exercises:      ? Standing side bridge:  Stand next to a wall and extend one arm toward the wall  Place your palm flat on the wall with your fingers pointing upward  Begin with abdominal bracing  Next, without moving your feet, slowly bend your arm to 90 degrees  Hold for 5 seconds  Repeat on the other side  When you can do this exercise without pain for 10 to 15 seconds, you may do the bent leg side bridge on the floor  ? Bent leg side bridge:  Lie on one side with your legs, hips, and shoulders in a straight line  Prop yourself up onto your forearm so your elbow is directly below your shoulder  Bend your knees back to 90 degrees  Begin with abdominal bracing  Next, lift your hips and balance yourself on your forearm and knees   Hold for 5 seconds  Repeat on the other side  When you can do this exercise without pain for 10 to 15 seconds, you may do the straight leg side bridge on the floor  ? Straight leg side bridge:  Lie on one side with your legs, hips, and shoulders in a straight line  Prop yourself up onto your forearm so your elbow is directly below your shoulder  Begin with abdominal bracing  Lift your hips off the floor and balance yourself on your forearm and the outside of your flexed foot  Do not let your ankle bend sideways  Hold for 5 seconds  Repeat on the other side  When you can do this exercise without pain for 10 to 15 seconds, ask your healthcare provider for more advanced exercises  · Superman:  Lie on your stomach  Extend your arms forward on the floor  Tighten your abdominal muscles and lift your right hand and left leg off the floor  Hold this position  Slowly return to the starting position  Tighten your abdominal muscles and lift your left hand and right leg off the floor  Hold this position  Slowly return to the starting position  · Clam:  Lie on your side with your knees bent  Put your bottom arm under your head to keep your neck in line  Put your top hand on your hip to keep your pelvis from moving  Put your heels together, and keep them together during this exercise  Slowly raise your top knee toward the ceiling  Then lower your leg so your knees are together  Repeat this exercise 10 times  Then switch sides and do the exercise 10 times with the other leg  · Curl up:  Lie on your back with your knees bent and feet flat on the floor  Place your hands, palms down, underneath your lower back  Next, with your elbows on the floor, lift your shoulders and chest 2 to 3 inches off the floor  Keep your head in line with your shoulders  Hold this position  Slowly return to the starting position  · Straight leg raises:  Lie on your back with one leg straight   Bend the other knee and place your foot flat on the floor  Tighten your abdominal muscles  Keep your leg straight and slowly lift it straight up 6 to 12 inches off the floor  Hold this position  Lower your leg slowly  Do as many repetitions as directed on this side  Repeat with the other leg  · Plank:  Lie on your stomach  Bend your elbows and place your forearms flat on the floor  Lift your chest, stomach, and knees off the floor  Make sure your elbows are below your shoulders  Your body should be in a straight line  Do not let your hips or lower back sink to the ground  Squeeze your abdominal muscles together and hold for 15 seconds  To make this exercise harder, hold for 30 seconds or lift 1 leg at a time  · Bicycles:  Lie on your back  Bend both knees and bring them toward your chest  Your calves should be parallel to the floor  Place the palms of your hands on the back of your head  Straighten your right leg and keep it lifted 2 inches off the floor  Raise your head and shoulders off the floor and twist towards your left  Keep your head and shoulders lifted  Bend your right knee while you straighten your left leg  Keep your left leg 2 inches off the floor  Twist your head and chest towards the left leg  Continue to straighten 1 leg at a time and twist        Follow up with your healthcare provider as directed:  Write down your questions so you remember to ask them during your visits  © Copyright [employer] 2021 Information is for End User's use only and may not be sold, redistributed or otherwise used for commercial purposes  All illustrations and images included in CareNotes® are the copyrighted property of A [employer] A M [employer] Inc  or Mouna Jessica   The above information is an  only  It is not intended as medical advice for individual conditions or treatments  Talk to your doctor, nurse or pharmacist before following any medical regimen to see if it is safe and effective for you    Lower Back Exercises AMBULATORY CARE:   Lower back exercises  help heal and strengthen your back muscles to prevent another injury  Ask your healthcare provider if you need to see a physical therapist for more advanced exercises  Seek care immediately if:   · You have severe pain that prevents you from moving  Contact your healthcare provider if:   · Your pain becomes worse  · You have new pain  · You have questions or concerns about your condition or care  Do lower back exercises safely:   · Do the exercises on a mat or firm surface  (not on a bed) to support your spine and prevent low back pain  · Move slowly and smoothly  Avoid fast or jerky motions  · Breathe normally  Do not hold your breath  · Stop if you feel pain  It is normal to feel some discomfort at first  Regular exercise will help decrease your discomfort over time  Lower back exercises: Your healthcare provider may recommend that you do back exercises 10 to 30 minutes each day  He may also recommend that you do exercises 1 to 3 times each day  Ask your healthcare provider which exercises are best for you and how often to do them  · Ankle pumps:  Lie on your back  Move your foot up (with your toes pointing toward your head)  Then, move your foot down (with your toes pointing away from you)  Repeat this exercise 10 times on each side  · Heel slides:  Lie on your back  Slowly bend one leg and then straighten it  Next, bend the other leg and then straighten it  Repeat 10 times on each side  · Pelvic tilt:  Lie on your back with your knees bent and feet flat on the floor  Place your arms in a relaxed position beside your body  Tighten the muscles of your abdomen and flatten your back against the floor  Hold for 5 seconds  Repeat 5 times  · Back stretch:  Lie on your back with your hands behind your head  Bend your knees and turn the lower half of your body to one side  Hold this position for 10 seconds   Repeat 3 times on each side  · Straight leg raises:  Lie on your back with one leg straight  Bend the other knee  Tighten your abdomen and then slowly lift the straight leg up about 6 to 12 inches off the floor  Hold for 1 to 5 seconds  Lower your leg slowly  Repeat 10 times on each leg  · Knee-to-chest:  Lie on your back with your knees bent and feet flat on the floor  Pull one of your knees toward your chest and hold it there for 5 seconds  Return your leg to the starting position  Lift the other knee toward your chest and hold for 5 seconds  Do this 5 times on each side  · Cat and camel:  Place your hands and knees on the floor  Arch your back upward toward the ceiling and lower your head  Round out your spine as much as you can  Hold for 5 seconds  Lift your head upward and push your chest downward toward the floor  Hold for 5 seconds  Do 3 sets or as directed  · Wall squats:  Stand with your back against a wall  Tighten the muscles of your abdomen  Slowly lower your body until your knees are bent at a 45 degree angle  Hold this position for 5 seconds  Slowly move back up to a standing position  Repeat 10 times  · Curl up:  Lie on your back with your knees bent and feet flat on the floor  Place your hands, palms down, underneath the curve in your lower back  Next, with your elbows on the floor, lift your shoulders and chest 2 to 3 inches  Keep your head in line with your shoulders  Hold this position for 5 seconds  When you can do this exercise without pain for 10 to 15 seconds, you may add a rotation  While your shoulders and chest are lifted off the ground, turn slightly to the left and hold  Repeat on the other side  · Bird dog:  Place your hands and knees on the floor  Keep your wrists directly below your shoulders and your knees directly below your hips  Pull your belly button in toward your spine  Do not flatten or arch your back  Tighten your abdominal muscles   Raise one arm straight out so that it is aligned with your head  Next, raise the leg opposite your arm  Hold this position for 15 seconds  Lower your arm and leg slowly and change sides  Do 5 sets  © Copyright [employer] 2021 Information is for End User's use only and may not be sold, redistributed or otherwise used for commercial purposes  All illustrations and images included in CareNotes® are the copyrighted property of [employer]  or Mouna Trammell  The above information is an  only  It is not intended as medical advice for individual conditions or treatments  Talk to your doctor, nurse or pharmacist before following any medical regimen to see if it is safe and effective for you  Pregabalin (By mouth)   Pregabalin ([id number])  Treats nerve and muscle pain, including fibromyalgia  Also treats partial-onset seizures  Brand Name(s): Lyrica Lyrica DANIEL   There may be other brand names for this medicine  When This Medicine Should Not Be Used: This medicine is not right for everyone  Do not use it if you had an allergic reaction to pregabalin  How to Use This Medicine:   Capsule, Liquid, Long Acting Tablet  · Take your medicine as directed  Your dose may need to be changed several times to find what works best for you  · Extended-release tablet: Swallow the extended-release tablet whole  Do not crush, break, or chew it  Take it after an evening meal   · Oral liquid: Measure the oral liquid medicine with a marked measuring spoon, oral syringe, or medicine cup  · This medicine should come with a Medication Guide  Ask your pharmacist for a copy if you do not have one  · Missed dose: Take a dose as soon as you remember  If it is almost time for your next dose, wait until then and take a regular dose  Do not take extra medicine to make up for a missed dose  If you miss a dose of the extended-release tablet after your evening meal, take it before bedtime after a snack   If you miss the dose before bedtime, take it after your morning meal  If you do not take the dose the following morning, then take the next dose at your regular time after your evening meal  Do not take 2 doses at the same time  · Store the medicine in a closed container at room temperature, away from heat, moisture, and direct light  Drugs and Foods to Avoid:   Ask your doctor or pharmacist before using any other medicine, including over-the-counter medicines, vitamins, and herbal products  · Some medicines can affect how pregabalin works  Tell your doctor if you are using any of the following:   ? ACE inhibitor (including benazepril, enalapril, lisinopril, quinapril, ramipril)  ? Oral diabetes medicine (including metformin, pioglitazone, rosiglitazone)  · Do not drink alcohol while you are using this medicine  · Tell your doctor if you use anything else that makes you sleepy  Some examples are allergy medicine, narcotic pain medicine, and alcohol  Tell your doctor if you are also using oxycodone, lorazepam, or zolpidem  Warnings While Using This Medicine:   · Tell your doctor if you are pregnant or breastfeeding, or if you have kidney disease, heart failure, heart rhythm problems, lung or breathing problems, a bleeding disorder, diabetes, sores or skin problems, or a low blood platelet count  Tell your doctor if you have a history of angioedema (severe swelling), alcohol or drug abuse, depression, or other mood problems  · This medicine may cause the following problems:   ? Angioedema (severe swelling), which may be life-threatening  ? Changes in mood or behavior, including suicidal thoughts or behavior  ? Respiratory depression (serious breathing problem that can be life-threatening), when used with narcotic pain medicines  ? Peripheral edema (swelling of your hands, ankles, feet, or lower legs)  ? Increased risk for cancer and bleeding  ? Serious muscle problems  ?  Heart rhythm changes  · This medicine may make you dizzy or drowsy  It may also cause blurry or double vision  Do not drive or do anything else that could be dangerous until you know how this medicine affects you  · Do not stop using this medicine suddenly  Your doctor will need to slowly decrease your dose before you stop it completely  · Your doctor will do lab tests at regular visits to check on the effects of this medicine  Keep all appointments  · Keep all medicine out of the reach of children  Never share your medicine with anyone  Possible Side Effects While Using This Medicine:   Call your doctor right away if you notice any of these side effects:  · Allergic reaction: Itching or hives, swelling in your face or hands, swelling or tingling in your mouth or throat, chest tightness, trouble breathing  · Blistering, peeling, red skin rash  · Blue lips, fingernails, or skin, trouble breathing, chest pain  · Blurry or double vision  · Fever, chills, cough, sore throat, body aches  · Muscle pain, tenderness, or weakness, general feeling of illness  · Rapid weight gain, swelling in your hands, ankles, or feet  · Severe dizziness or drowsiness  · Sudden mood changes, unusual moods or behavior, including extreme happiness or depression, thoughts or attempts of killing oneself  · Swelling in your throat, head, or neck  · Uneven heartbeat  · Unusual bleeding, bruising, or weakness  If you notice these less serious side effects, talk with your doctor:   · Confusion, trouble concentrating  · Constipation  · Dry mouth  If you notice other side effects that you think are caused by this medicine, tell your doctor  Call your doctor for medical advice about side effects  You may report side effects to FDA at 4-138-JWS-8789  © Copyright [employer] 2021 Information is for End User's use only and may not be sold, redistributed or otherwise used for commercial purposes  The above information is an  only   It is not intended as medical advice for individual conditions or treatments  Talk to your doctor, nurse or pharmacist before following any medical regimen to see if it is safe and effective for you  Cyclobenzaprine (By mouth)   Cyclobenzaprine ([id number]-[id number])  Treats pain and stiffness caused by muscle spasms  Brand Name(s): Amrix, CycloTENS Refill Brian, CycloTENS Starter Brian, Fexmid, FusePaq [place], RapidPaq Tabradol   There may be other brand names for this medicine  When This Medicine Should Not Be Used: This medicine is not right for everyone  Do not use it if you had an allergic reaction to cyclobenzaprine  How to Use This Medicine:   Long Acting Capsule, Liquid, Tablet  · Your doctor will tell you how much medicine to use  Do not use more than directed  · Take this medicine at the same time each day  · Swallow the extended-release capsule whole  Do not crush, break, or chew it  · If you cannot swallow the capsule whole, you may open the capsule and sprinkle the contents over one tablespoon of applesauce  Swallow the mixture right away without chewing  Rinse the mouth to make sure all of the medicine have been swallowed  Do not save any of the mixture to use later  · This medicine is not for long-term use  · Missed dose: Take a dose as soon as you remember  If it is almost time for your next dose, wait until then and take a regular dose  Do not take extra medicine to make up for a missed dose  · Store the medicine in a closed container at room temperature, away from heat, moisture, and direct light  Drugs and Foods to Avoid:   Ask your doctor or pharmacist before using any other medicine, including over-the-counter medicines, vitamins, and herbal products  · Do not use this medicine if you have used an MAO inhibitor (MAOI) within 14 days of each other  · Some foods and medicines can affect how this medicine works  Tell your doctor if you are using any of the following:   ?  Bupropion, guanethidine, meperidine, tramadol, verapamil  ? Medicine to treat depression (including amitriptyline, imipramine)  · Do not drink alcohol while you are using this medicine  · Tell your doctor if you use anything else that makes you sleepy  Some examples are allergy medicine, narcotic pain medicine, and alcohol  Warnings While Using This Medicine:   · Tell your doctor if you are pregnant or breastfeeding, or if you have liver problems, congestive heart failure, heart rhythm problems, a recent heart attack, overactive thyroid, or a history of glaucoma or trouble urinating  · This medicine may cause the following problems:  ? Serotonin syndrome, when taken with certain medicines  · This medicine may make you dizzy or drowsy  Do not drive or doing anything that could be dangerous until you know how this medicine affects you  · Call your doctor if your symptoms do not improve or if they get worse  · Keep all medicine out of the reach of children  Never share your medicine with anyone  Possible Side Effects While Using This Medicine:   Call your doctor right away if you notice any of these side effects:  · Allergic reaction: Itching or hives, swelling in your face or hands, swelling or tingling in your mouth or throat, chest tightness, trouble breathing  · Anxiety, restlessness, fever, sweating, twitching, nausea, vomiting, diarrhea, seeing or hearing things that are not there  · Fast, pounding, or uneven heartbeat  · Severe drowsiness, fainting, or confusion  If you notice these less serious side effects, talk with your doctor:   · Dizziness  · Dry mouth  If you notice other side effects that you think are caused by this medicine, tell your doctor  Call your doctor for medical advice about side effects  You may report side effects to FDA at 2-940-EXR-6654  © Copyright [employer] 2021 Information is for End User's use only and may not be sold, redistributed or otherwise used for commercial purposes    The above information is an educational aid only  It is not intended as medical advice for individual conditions or treatments  Talk to your doctor, nurse or pharmacist before following any medical regimen to see if it is safe and effective for you

## 2021-12-19 ENCOUNTER — HOSPITAL ENCOUNTER (EMERGENCY)
Facility: HOSPITAL | Age: 31
Discharge: HOME | End: 2021-12-19
Attending: EMERGENCY MEDICINE
Payer: COMMERCIAL

## 2021-12-19 VITALS
TEMPERATURE: 98.1 F | WEIGHT: 185.63 LBS | OXYGEN SATURATION: 99 % | HEART RATE: 73 BPM | BODY MASS INDEX: 32.88 KG/M2 | DIASTOLIC BLOOD PRESSURE: 93 MMHG | SYSTOLIC BLOOD PRESSURE: 139 MMHG | RESPIRATION RATE: 16 BRPM

## 2021-12-19 DIAGNOSIS — S05.02XA ABRASION OF LEFT CORNEA, INITIAL ENCOUNTER: Primary | ICD-10-CM

## 2021-12-19 PROCEDURE — 99283 EMERGENCY DEPT VISIT LOW MDM: CPT

## 2021-12-19 PROCEDURE — 99284 EMERGENCY DEPT VISIT MOD MDM: CPT | Performed by: PHYSICIAN ASSISTANT

## 2021-12-19 RX ORDER — ERYTHROMYCIN 5 MG/G
OINTMENT OPHTHALMIC
Qty: 1 G | Refills: 0 | Status: SHIPPED | OUTPATIENT
Start: 2021-12-19 | End: 2022-03-07

## 2021-12-19 RX ORDER — TETRACAINE HYDROCHLORIDE 5 MG/ML
2 SOLUTION OPHTHALMIC ONCE
Status: COMPLETED | OUTPATIENT
Start: 2021-12-19 | End: 2021-12-19

## 2021-12-19 RX ORDER — ERYTHROMYCIN 5 MG/G
OINTMENT OPHTHALMIC
Qty: 1 G | Refills: 0 | Status: SHIPPED | OUTPATIENT
Start: 2021-12-19 | End: 2021-12-19 | Stop reason: SDUPTHER

## 2021-12-19 RX ADMIN — TETRACAINE HYDROCHLORIDE 2 DROP: 5 SOLUTION OPHTHALMIC at 14:55

## 2021-12-19 RX ADMIN — FLUORESCEIN SODIUM 1 STRIP: 0.6 STRIP OPHTHALMIC at 14:55

## 2022-01-26 ENCOUNTER — PROCEDURE VISIT (OUTPATIENT)
Dept: PAIN MEDICINE | Facility: CLINIC | Age: 32
End: 2022-01-26
Payer: COMMERCIAL

## 2022-01-26 DIAGNOSIS — M79.18 MYOFASCIAL PAIN SYNDROME: Primary | ICD-10-CM

## 2022-01-26 PROCEDURE — 76942 ECHO GUIDE FOR BIOPSY: CPT | Performed by: PHYSICAL MEDICINE & REHABILITATION

## 2022-01-26 PROCEDURE — 20552 NJX 1/MLT TRIGGER POINT 1/2: CPT | Performed by: PHYSICAL MEDICINE & REHABILITATION

## 2022-01-26 RX ORDER — TRIAMCINOLONE ACETONIDE 40 MG/ML
40 INJECTION, SUSPENSION INTRA-ARTICULAR; INTRAMUSCULAR ONCE
Status: COMPLETED | OUTPATIENT
Start: 2022-01-26 | End: 2022-01-26

## 2022-01-26 RX ORDER — BUPIVACAINE HYDROCHLORIDE 5 MG/ML
10 INJECTION, SOLUTION PERINEURAL ONCE
Status: COMPLETED | OUTPATIENT
Start: 2022-01-26 | End: 2022-01-26

## 2022-01-26 RX ADMIN — TRIAMCINOLONE ACETONIDE 40 MG: 40 INJECTION, SUSPENSION INTRA-ARTICULAR; INTRAMUSCULAR at 15:05

## 2022-01-26 RX ADMIN — BUPIVACAINE HYDROCHLORIDE 10 ML: 5 INJECTION, SOLUTION PERINEURAL at 15:04

## 2022-01-26 NOTE — PROGRESS NOTES
Indication:  Muscular pain  Preprocedure diagnosis:  1  Myofascial pain syndrome  Postprocedure diagnosis:  1  Myofascial pain syndrome    Procedure:  Ultrasound-guided bilateral thoracic paraspinal muscle trigger point injection(s)  After discussing the risks, benefits, and alternatives to the procedure, the patient expressed understanding and wished to proceed  The patient was brought to the procedure suite and placed in the prone position  A procedural pause was conducted to verify:  correct patient identity, procedure to be performed and as applicable, correct side and site, correct patient position, and availability of implants, special equipment or special requirements  A simple surgical tray was used  Prior to the procedure, the bilateral thoracic paraspinal musculature was examined with a 12 MHz linear transducer to visualize the targeted trigger points and determine the optimal needle path  Following this, the area was prepared with a ChloraPrep scrub, then re-examined using the same transducer, a sterile ultrasound transducer cover, and sterile ultrasound transducer gel  Thereafter, using ultrasound guidance, a 2 5-inch 25-gauge needle was advanced into the targeted trigger points  After visualization of the tip and negative aspiration for blood, a mixture of 40 milligrams/milliliter Kenalog with 0 5% bupivacaine was injected into the targeted trigger points  Following the injection, the needle was withdrawn  The patient tolerated the procedure well and there were no apparent complications  After an appropriate amount of observation, the patient was dismissed from the clinic in good condition under their own power

## 2022-03-03 ENCOUNTER — TELEPHONE (OUTPATIENT)
Dept: PAIN MEDICINE | Facility: CLINIC | Age: 32
End: 2022-03-03

## 2022-03-03 DIAGNOSIS — G89.4 CHRONIC PAIN DISORDER: ICD-10-CM

## 2022-03-03 DIAGNOSIS — M79.18 MYOFASCIAL PAIN SYNDROME: ICD-10-CM

## 2022-03-03 RX ORDER — CYCLOBENZAPRINE HCL 5 MG
5 TABLET ORAL 3 TIMES DAILY PRN
Qty: 90 TABLET | Refills: 2 | Status: SHIPPED | OUTPATIENT
Start: 2022-03-03 | End: 2022-04-07 | Stop reason: SDUPTHER

## 2022-03-03 NOTE — TELEPHONE ENCOUNTER
Pt contacted Call Center requested refill of their medication  Medication Name:cyclobenzaprine (FLEXERIL          Dosage of Med: 5 mg       Frequency of Med:Take 1 tablet (5 mg total) by mouth 3 (three) times a day as needed for muscle spasms      Remaining Medication: 0      Pharmacy and Location:  [address] [place] [address],[facility], [address] [place] [address]   [facility], [address] [id number]   Phone:  [phone]      Pt  Preferred Callback Phone Number: [phone]      Thank you  Jess Hernandez Pt contacted Call Center requested refill of their medication  Medication Name:pregabalin (LYRICA          Dosage of Med:75 mg       Frequency of Med:Take 1 capsule (75 mg total) by mouth 2 (two) times a day      Remaining Medication:0      Pharmacy and Location:  [address] [place] [address],[facility], [address] [place] [address]   [facility], [address] [id number]   Phone:  [phone]      Pt  Preferred Callback Phone Number: [phone]      Thank you

## 2022-03-03 NOTE — TELEPHONE ENCOUNTER
Please let the patient know that I refilled her Flexeril but I did not refill her Lyrica  She is not taking it as prescribed and it was last filled in September    Please let the patient know that Lyrica is not an as-needed medication

## 2022-03-03 NOTE — PATIENT INSTRUCTIONS
Thank you for visiting [facility]  You may be invited to complete a survey about you visit  Your responses will help us improve care we provide  A 10 means the care you received at your visit met your expectations  If you are unable to give a 10 please list reasons so we can work on improving your patient experience  Bacterial Vaginosis   WHAT YOU NEED TO KNOW:   What is bacterial vaginosis? Bacterial vaginosis is an infection in the vagina  It may cause vaginitis (irritation and inflammation of the vagina)  The cause is not known  Bacteria normally found in the vagina are imbalanced  Your risk increases if you are sexually active, you use a douche, or you have an intrauterine device (IUD)  What are common signs and symptoms of bacterial vaginosis? · White, gray, or yellow vaginal discharge    · Vaginal discharge that smells like fish    · Itching or burning around the outside of your vagina    How is bacterial vaginosis diagnosed? Your healthcare provider will examine you and ask if you have other health conditions  He or she may need to take a sample of fluid from your vagina  This will be tested for the bacteria that causes vaginosis  How is bacterial vaginosis treated? Antibiotics are given to kill the bacteria  They may be given as a pill or as a cream to put in your vagina  What do I need to know about bacterial vaginosis and pregnancy? If you have bacterial vaginosis during pregnancy, your baby may be born early or have a low birth weight  Your healthcare provider may recommend testing for bacterial vaginosis before or during your pregnancy  He or she will talk to you about your risk for premature delivery, and make sure you know the benefits and risks of testing  How can I prevent bacterial vaginosis? · Keep your vaginal area clean and dry  Wear underwear and pantyhose with a cotton crotch  Wipe from front to back after you urinate or have a bowel movement   After you bathe, rinse soap from your vaginal area to decrease your risk for irritation  · Do not use products that cause irritation  Always use unscented tampons or sanitary pads  Do not use feminine sprays, powders, or scented tampons  They may cause irritation and increase your risk for vaginosis  Detergents and fabric softeners may also cause irritation  · Do not use a douche  This can cause an imbalance in healthy vaginal bacteria  · Use latex condoms during sex  This helps prevent another infection and keeps your partner from getting the infection  When should I call my doctor or gynecologist?   · Your symptoms come back or do not improve with treatment  · You have vaginal bleeding that is not your monthly period  · You have questions or concerns about your condition or care  CARE AGREEMENT:   You have the right to help plan your care  Learn about your health condition and how it may be treated  Discuss treatment options with your healthcare providers to decide what care you want to receive  You always have the right to refuse treatment  The above information is an  only  It is not intended as medical advice for individual conditions or treatments  Talk to your doctor, nurse or pharmacist before following any medical regimen to see if it is safe and effective for you  © Copyright [employer] 2022 Information is for End User's use only and may not be sold, redistributed or otherwise used for commercial purposes  All illustrations and images included in CareNotes® are the copyrighted property of [employer]  or [address]    Fluconazole (By mouth)   Fluconazole ([id number]-HVY-[id number])  Prevents and treats fungal infections  Brand Name(s): Diflucan   There may be other brand names for this medicine  When This Medicine Should Not Be Used: This medicine is not right for everyone  Do not use it if you had an allergic reaction to fluconazole, or if you are pregnant    How to Use This Medicine:   Liquid, Tablet  · Your doctor will tell you how much medicine to use  Do not use more than directed  · Oral liquid: Shake well just before each use  Measure the oral liquid medicine with a marked measuring spoon, oral syringe, or medicine cup  · Take all of the medicine in your prescription to clear up your infection, even if you feel better after the first few doses  · Read and follow the patient instructions that come with this medicine  Talk to your doctor or pharmacist if you have any questions  · Missed dose: Take a dose as soon as you remember  If it is almost time for your next dose, wait until then and take a regular dose  Do not take extra medicine to make up for a missed dose  · Store the medicine in a closed container at room temperature, away from heat, moisture, and direct light  You may store the oral liquid in the refrigerator or at room temperature for up to 14 days  Do not freeze  Drugs and Foods to Avoid:   Ask your doctor or pharmacist before using any other medicine, including over-the-counter medicines, vitamins, and herbal products  · Do not use this medicine together with erythromycin, pimozide, or quinidine  · Some medicines can affect how fluconazole works  Tell your doctor of all medicines you are taking, including any of the following:   ? Amiodarone, amphotericin B, azithromycin, cimetidine, cyclosporine, lemborexant, midazolam, prednisone, rifabutin, rifampin, sirolimus, tacrolimus, theophylline, tofacitinib, tolvaptan, triazolam, vitamin A supplements, voriconazole  ? Birth control pills  ? Blood pressure medicine (including amlodipine, felodipine, isradipine, losartan, nifedipine, verapamil)  ? Blood thinner (including warfarin)  ? Cancer medicine (including cyclophosphamide, ibrutinib, olaparib, vinblastine, vincristine)  ? Diuretic (water pill, including hydrochlorothiazide)  ?  Medicine to lower cholesterol (including atorvastatin, fluvastatin, simvastatin)  ? Medicine to treat depression (including amitriptyline, nortriptyline)  ? Medicine to treat HIV/AIDS (including saquinavir, zidovudine)  ? Medicine to treat seizures (including carbamazepine, phenytoin)  ? Narcotic pain medicine (including alfentanil, fentanyl, methadone)  ? NSAID pain or arthritis medicine (including celecoxib, diclofenac, flurbiprofen, ibuprofen, naproxen)  ? Oral diabetes medicine (including glipizide, glyburide, tolbutamide)  · Check with your doctor before starting any new medicines within 7 days of using this medicine  Warnings While Using This Medicine:   · It is not safe to take this medicine during pregnancy  It could harm an unborn baby  Tell your doctor right away if you become pregnant  Use an effective form of birth control during treatment with this medicine and for at least 1 week after the last dose  · Tell your doctor if you are breastfeeding, or if you have kidney disease, liver disease, heart disease, heart failure, heart rhythm problems, cancer, HIV/AIDS, or hereditary problems  · This medicine may cause the following problems:   ? Liver problems  ? Serious skin reactions  ? Changes in heart rhythm, including QT prolongation  ? Adrenal gland problems  · This medicine may make you dizzy or drowsy  Do not drive or do anything else that could be dangerous until you know how this medicine affects you  · Call your doctor if your symptoms do not improve or if they get worse  · Your doctor will do lab tests at regular visits to check on the effects of this medicine  Keep all appointments  · Keep all medicine out of the reach of children  Never share your medicine with anyone    Possible Side Effects While Using This Medicine:   Call your doctor right away if you notice any of these side effects:  · Allergic reaction: Itching or hives, swelling in your face or hands, swelling or tingling in your mouth or throat, chest tightness, trouble breathing  · Blistering, peeling, or red skin rash  · Changes in skin color, dark freckles, cold feeling, weight loss  · Dark urine or pale stools, nausea, vomiting, loss of appetite, stomach pain, yellow skin or eyes  · Fainting, dizziness, lightheadedness  · Fast, pounding, or uneven heartbeat  · Unusual bleeding, bruising, or weakness  If you notice these less serious side effects, talk with your doctor:   · Headache  · Mild nausea, vomiting, diarrhea  If you notice other side effects that you think are caused by this medicine, tell your doctor  Call your doctor for medical advice about side effects  You may report side effects to FDA at 6-340-VES-6901    © Copyright [employer] 2022 Information is for End User's use only and may not be sold, redistributed or otherwise used for commercial purposes  The above information is an  only  It is not intended as medical advice for individual conditions or treatments  Talk to your doctor, nurse or pharmacist before following any medical regimen to see if it is safe and effective for you  Bacterial Vaginosis   WHAT YOU NEED TO KNOW:   What is bacterial vaginosis? Bacterial vaginosis is an infection in the vagina  It may cause vaginitis (irritation and inflammation of the vagina)  The cause is not known  Bacteria normally found in the vagina are imbalanced  Your risk increases if you are sexually active, you use a douche, or you have an intrauterine device (IUD)  What are common signs and symptoms of bacterial vaginosis? · White, gray, or yellow vaginal discharge    · Vaginal discharge that smells like fish    · Itching or burning around the outside of your vagina    How is bacterial vaginosis diagnosed? Your healthcare provider will examine you and ask if you have other health conditions  He or she may need to take a sample of fluid from your vagina  This will be tested for the bacteria that causes vaginosis  How is bacterial vaginosis treated?   Antibiotics are given to kill the bacteria  They may be given as a pill or as a cream to put in your vagina  What do I need to know about bacterial vaginosis and pregnancy? If you have bacterial vaginosis during pregnancy, your baby may be born early or have a low birth weight  Your healthcare provider may recommend testing for bacterial vaginosis before or during your pregnancy  He or she will talk to you about your risk for premature delivery, and make sure you know the benefits and risks of testing  How can I prevent bacterial vaginosis? · Keep your vaginal area clean and dry  Wear underwear and pantyhose with a cotton crotch  Wipe from front to back after you urinate or have a bowel movement  After you bathe, rinse soap from your vaginal area to decrease your risk for irritation  · Do not use products that cause irritation  Always use unscented tampons or sanitary pads  Do not use feminine sprays, powders, or scented tampons  They may cause irritation and increase your risk for vaginosis  Detergents and fabric softeners may also cause irritation  · Do not use a douche  This can cause an imbalance in healthy vaginal bacteria  · Use latex condoms during sex  This helps prevent another infection and keeps your partner from getting the infection  When should I call my doctor or gynecologist?   · Your symptoms come back or do not improve with treatment  · You have vaginal bleeding that is not your monthly period  · You have questions or concerns about your condition or care  CARE AGREEMENT:   You have the right to help plan your care  Learn about your health condition and how it may be treated  Discuss treatment options with your healthcare providers to decide what care you want to receive  You always have the right to refuse treatment  The above information is an  only  It is not intended as medical advice for individual conditions or treatments   Talk to your doctor, nurse or pharmacist before following any medical regimen to see if it is safe and effective for you  © Copyright [employer] 2022 Information is for End User's use only and may not be sold, redistributed or otherwise used for commercial purposes   All illustrations and images included in CareNotes® are the copyrighted property of [employer] Inc  or [facility] Sophie Jessica

## 2022-03-03 NOTE — TELEPHONE ENCOUNTER
S/W pt  Pt stated the flexeril helps her and denies side effects  She stated the lyrcia helps some, makes it bearable, denies side effects and her last dose was 1 week ago  Please advise

## 2022-03-03 NOTE — PROGRESS NOTES
Assessment/Plan:      Diagnoses and all orders for this visit:    BV (bacterial vaginosis)  -     metroNIDAZOLE (METROGEL) 0 75 % vaginal gel; Insert 1 application into the vagina daily at bedtime for 5 days  -     fluconazole (DIFLUCAN) 150 mg tablet; Take 1 tablet (150 mg total) by mouth once for 1 dose    Vaginal discharge  -     POCT wet mount    S/P LEEP  -     Liquid-based pap, screening    Cervical cancer screening  -     Liquid-based pap, screening    Other orders  -     minocycline (MINOCIN) 100 mg capsule; Take 100 mg by mouth daily  -     etonogestrel (Nexplanon) subdermal implant; 68 mg by Subdermal route once      -reviewed diagnosis of bacterial vaginosis and treatment with metrogel and fluconazole  Written information provided  Patient will use Metrogel nightly for 5 nights  Will take fluconazole on day 5 of use  To prevent rebound yeast infection  -  Hygiene reviewed including avoid scented soaps, lotions and lubricants, avoid tight/restrictive clothing, avoid douching  Patient believes intercourse is triggering event  Reviewed with patient can use boric acid capsule mixed in 8-12oz of warm water immediately after intercourse and then repeat in 12 hours if still having symptoms  Encouraged partner to continue using gentle soap on genitals  Reviewed with patient can try probiotic may decrease episodes  -reviewed history of abnormal Pap smears and need for repeat Pap smear 6 months status post LEEP  Patient is agreeable for Pap smear collection at today's visit  -will call with results and developed plan of care    RTO annual exam     Subjective:     Patient ID: Marcia Mixon is a 27 y o  female  HPI G5 P for here with complaints of vaginal discharge for 2 weeks  Associated symptoms include foul odor  Denies alleviating factors  Aggravating factors/triggering event includes intercourse  Patient has a long history of bacterial vaginosis    Has been treated with oral metronidazole, Metrogel in the past  Patient is sexually active using Nexplanon for contraception  Denies new partner, pelvic pain,fever, chills and bowel concerns  Is not interested in STI testing  Is getting Lasix surgery Thursday  Pap smear history 4/1/21 ASCUS cannot r/o HSIL; 4/29/21 colposcopy/ECC at least LSIL focally suspicious for HSIL; 7/16/21 Pap smears squamous dysplasia, favors HSIL; LEEP 9/2022  Gardasil no    Review of Systems   Constitutional: Negative for chills, fatigue and fever  Respiratory: Negative  Cardiovascular: Negative  Genitourinary: Positive for vaginal discharge  Negative for decreased urine volume, difficulty urinating, dyspareunia, dysuria, enuresis, flank pain, frequency, hematuria, menstrual problem, pelvic pain, urgency, vaginal bleeding and vaginal pain  Objective:  /70   Ht 5' 3" (1 6 m)   Wt 82 1 kg (181 lb)   LMP 02/28/2022   BMI 32 06 kg/m²      Physical Exam  Vitals reviewed  Constitutional:       Appearance: Normal appearance  Genitourinary:     General: Normal vulva  Vagina: Vaginal discharge present  Cervix: Normal       Uterus: Normal        Comments: Large amount of white/ gray vaginal discharge noted on exam    Neurological:      Mental Status: She is alert and oriented to person, place, and time     Psychiatric:         Mood and Affect: Mood normal          Behavior: Behavior normal

## 2022-03-07 ENCOUNTER — OFFICE VISIT (OUTPATIENT)
Dept: OBGYN CLINIC | Facility: MEDICAL CENTER | Age: 32
End: 2022-03-07
Payer: COMMERCIAL

## 2022-03-07 VITALS
DIASTOLIC BLOOD PRESSURE: 70 MMHG | BODY MASS INDEX: 32.07 KG/M2 | SYSTOLIC BLOOD PRESSURE: 100 MMHG | HEIGHT: 63 IN | WEIGHT: 181 LBS

## 2022-03-07 DIAGNOSIS — N89.8 VAGINAL DISCHARGE: ICD-10-CM

## 2022-03-07 DIAGNOSIS — B96.89 BV (BACTERIAL VAGINOSIS): Primary | ICD-10-CM

## 2022-03-07 DIAGNOSIS — Z12.4 CERVICAL CANCER SCREENING: ICD-10-CM

## 2022-03-07 DIAGNOSIS — Z98.890 S/P LEEP: ICD-10-CM

## 2022-03-07 DIAGNOSIS — N76.0 BV (BACTERIAL VAGINOSIS): Primary | ICD-10-CM

## 2022-03-07 LAB
BV WHIFF TEST VAG QL: ABNORMAL
CLUE CELLS SPEC QL WET PREP: ABNORMAL
PH SMN: 5.5 [PH]
SL AMB POCT WET MOUNT: ABNORMAL
T VAGINALIS VAG QL WET PREP: ABNORMAL
YEAST VAG QL WET PREP: ABNORMAL

## 2022-03-07 PROCEDURE — 99213 OFFICE O/P EST LOW 20 MIN: CPT | Performed by: NURSE PRACTITIONER

## 2022-03-07 PROCEDURE — 87210 SMEAR WET MOUNT SALINE/INK: CPT | Performed by: NURSE PRACTITIONER

## 2022-03-07 PROCEDURE — G0476 HPV COMBO ASSAY CA SCREEN: HCPCS | Performed by: NURSE PRACTITIONER

## 2022-03-07 PROCEDURE — G0145 SCR C/V CYTO,THINLAYER,RESCR: HCPCS | Performed by: NURSE PRACTITIONER

## 2022-03-07 RX ORDER — FLUCONAZOLE 150 MG/1
150 TABLET ORAL ONCE
Qty: 1 TABLET | Refills: 0 | Status: SHIPPED | OUTPATIENT
Start: 2022-03-07 | End: 2022-03-07

## 2022-03-07 RX ORDER — METRONIDAZOLE 7.5 MG/G
1 GEL VAGINAL
Qty: 5 G | Refills: 0 | Status: SHIPPED | OUTPATIENT
Start: 2022-03-07 | End: 2022-03-12

## 2022-03-07 RX ORDER — MINOCYCLINE HYDROCHLORIDE 100 MG/1
100 CAPSULE ORAL DAILY
COMMUNITY
Start: 2022-03-03

## 2022-03-07 RX ORDER — ETONOGESTREL 68 MG/1
68 IMPLANT SUBCUTANEOUS ONCE
COMMUNITY

## 2022-03-09 LAB
HPV HR 12 DNA CVX QL NAA+PROBE: NEGATIVE
HPV16 DNA CVX QL NAA+PROBE: NEGATIVE
HPV18 DNA CVX QL NAA+PROBE: NEGATIVE

## 2022-03-12 LAB
LAB AP GYN PRIMARY INTERPRETATION: NORMAL
LAB AP LMP: NORMAL
Lab: NORMAL
PATH INTERP SPEC-IMP: NORMAL

## 2022-03-18 ENCOUNTER — TELEPHONE (OUTPATIENT)
Dept: PAIN MEDICINE | Facility: CLINIC | Age: 32
End: 2022-03-18

## 2022-03-18 NOTE — TELEPHONE ENCOUNTER
Pt called with increased pain in her neck, shoulder, and back and stated that the Flexeril isnt helping for pain- pain level 9/10       n [phone]

## 2022-03-21 NOTE — TELEPHONE ENCOUNTER
Spoke to pt regarding pain in neck ,shoulders and back describing it as "sore and almost pressure like and my muscles hurt"  Pt stated pain is worse at night  Pt stated that she is taking flexeril 3 times a day as directed  Pt stated that flexeril was helping but the past couple of days it hasn't been   Pt stated she has been using lidocaine patches as well with little relief     Last OV 9/30/21  Please advise

## 2022-04-07 ENCOUNTER — OFFICE VISIT (OUTPATIENT)
Dept: PAIN MEDICINE | Facility: CLINIC | Age: 32
End: 2022-04-07
Payer: COMMERCIAL

## 2022-04-07 VITALS
BODY MASS INDEX: 32.07 KG/M2 | WEIGHT: 181 LBS | HEART RATE: 75 BPM | SYSTOLIC BLOOD PRESSURE: 120 MMHG | DIASTOLIC BLOOD PRESSURE: 86 MMHG | OXYGEN SATURATION: 98 % | HEIGHT: 63 IN

## 2022-04-07 DIAGNOSIS — M62.830 SPASM OF BACK MUSCLES: ICD-10-CM

## 2022-04-07 DIAGNOSIS — M79.18 MYOFASCIAL PAIN SYNDROME: ICD-10-CM

## 2022-04-07 DIAGNOSIS — G89.4 CHRONIC PAIN SYNDROME: Primary | ICD-10-CM

## 2022-04-07 DIAGNOSIS — M54.2 NECK PAIN: ICD-10-CM

## 2022-04-07 DIAGNOSIS — M54.9 MID BACK PAIN: ICD-10-CM

## 2022-04-07 DIAGNOSIS — M62.838 SPASM OF CERVICAL PARASPINOUS MUSCLE: ICD-10-CM

## 2022-04-07 PROCEDURE — 99214 OFFICE O/P EST MOD 30 MIN: CPT | Performed by: NURSE PRACTITIONER

## 2022-04-07 RX ORDER — GATIFLOXACIN 5 MG/ML
SOLUTION/ DROPS OPHTHALMIC
COMMUNITY
Start: 2022-03-09

## 2022-04-07 RX ORDER — CYCLOBENZAPRINE HCL 5 MG
5 TABLET ORAL 3 TIMES DAILY PRN
Qty: 90 TABLET | Refills: 5 | Status: SHIPPED | OUTPATIENT
Start: 2022-04-07

## 2022-04-07 RX ORDER — FLUCONAZOLE 150 MG/1
TABLET ORAL
COMMUNITY
Start: 2022-03-07

## 2022-04-07 RX ORDER — PREDNISOLONE ACETATE 10 MG/ML
SUSPENSION/ DROPS OPHTHALMIC
COMMUNITY
Start: 2022-03-09

## 2022-04-07 NOTE — PROGRESS NOTES
Assessment  1  Chronic pain syndrome    2  Mid back pain    3  Myofascial pain syndrome    4  Spasm of back muscles    5  Neck pain    6  Spasm of cervical paraspinous muscle        Plan  The patient was seen in the office for follow-up of her chronic pain secondary to spasms of her thoracic paraspinal muscles bilaterally as well as neck pain due to cervical paraspinal spasms bilaterally and upper trapezius muscle spasms bilaterally  She was last seen in the office on 2021 and had ultrasound-guided bilateral thoracic paraspinal muscle trigger point injections on 2022  She states that these trigger point injections significantly reduced her pain symptoms  However, recently her pain has started to return and she is also having neck spasms  At this time we will do the followin  Schedule bilateral ultrasound-guided thoracic paraspinal trigger point injections    2  Schedule bilateral cervical paraspinal and upper trapezius muscle trigger point injections 2 weeks after thoracic paraspinal muscle trigger point injections  3  Start physical therapy for neck pain and cervical radiculopathy as well as thoracic spine pain and myofascial pain  If no improvement from physical therapy, will obtain MRI of cervical spine for further evaluation  4  Continue cyclobenzaprine 5 mg up to 3 times daily as needed for muscle spasms  Patient reports no side effects from these medications and prescription was sent to the pharmacy on file  Complete risks and benefits including bleeding, infection, tissue reaction, nerve injury and allergic reaction were discussed  The approach was demonstrated using models and literature was provided  Verbal and written consent was obtained  My impressions and treatment recommendations were discussed in detail with the patient who verbalized understanding and had no further questions  Discharge instructions were provided   I personally saw and examined the patient and I agree with the above discussed plan of care  Orders Placed This Encounter   Procedures    US guidance     Bilateral Thoracic paraspinal TPIs     Standing Status:   Future     Standing Expiration Date:   4/7/2026     Scheduling Instructions:      No prep required  Please bring your insurance cards, a form of photo ID and a list of your medications with you  Arrive 15 minutes prior to your appointment time in order to register  To schedule this appointment, please contact Central Scheduling at [phone]  Order Specific Question:   Is the patient pregnant? Answer:   No     Order Specific Question:   What is the patient's sedation requirement? Answer:   No Sedation    US guidance     B/L cervical paraspinal and upper trapezius muscle TPIs     Standing Status:   Future     Standing Expiration Date:   4/7/2026     Scheduling Instructions:      No prep required  Please bring your insurance cards, a form of photo ID and a list of your medications with you  Arrive 15 minutes prior to your appointment time in order to register  To schedule this appointment, please contact Central Scheduling at [phone]  Order Specific Question:   Is the patient pregnant? Answer:   No     Order Specific Question:   What is the patient's sedation requirement?      Answer:   No Sedation    Ambulatory referral to Physical Therapy     Standing Status:   Future     Standing Expiration Date:   4/7/2023     Referral Priority:   Routine     Referral Type:   Physical Therapy     Referral Reason:   Specialty Services Required     Requested Specialty:   Physical Therapy     Number of Visits Requested:   1     Expiration Date:   4/7/2023     New Medications Ordered This Visit   Medications    fluconazole (DIFLUCAN) 150 mg tablet    gatifloxacin (ZYMAXID) 0 5 %    prednisoLONE acetate (PRED FORTE) 1 % ophthalmic suspension    cyclobenzaprine (FLEXERIL) 5 mg tablet     Sig: Take 1 tablet (5 mg total) by mouth 3 (three) times a day as needed for muscle spasms     Dispense:  90 tablet     Refill:  5       History of Present Illness    Xi Hoffmann is a 27 y o  female who reports a pain score of 8/10 today that is constant to some degree and worse in the evening  She describes the quality of her pain as dull aching and pressure-like  She states that the pain is in her thoracic spine and is the same as it was prior to the trigger point injections in that area back in January  She states that she also has new onset neck and upper back pain    I have personally reviewed and/or updated the patient's past medical history, past surgical history, family history, social history, current medications, allergies, and vital signs today  Review of Systems   Respiratory: Negative for shortness of breath  Cardiovascular: Negative for chest pain  Gastrointestinal: Negative for constipation, diarrhea, nausea and vomiting  Musculoskeletal: Positive for arthralgias, gait problem and neck pain  Negative for joint swelling  Skin: Negative for rash  Neurological: Negative for dizziness, seizures and weakness  All other systems reviewed and are negative  Patient Active Problem List   Diagnosis    Occipital neuralgia of left side    Myofascial pain syndrome    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    Cervical dysplasia    Light cigarette smoker    S/P LEEP    Anxiety    Chronic pain disorder       Past Medical History:   Diagnosis Date    Abnormal Pap smear of cervix     Anxiety     Chronic pain disorder     HPV (human papilloma virus) infection     Neck pain     Osteoarthritis        Past Surgical History:   Procedure Laterality Date    COLPOSCOPY      ECTOPIC PREGNANCY SURGERY      ID CONIZATION CERVIX,LOOP ELECTRD N/A 9/22/2021    Procedure: BIOPSY LEEP CERVIX;  Surgeon: Kylie Ford MD;  Location: [facility];  Service: Gynecology       Family History   Problem Relation Age of Onset    No Known Problems Mother     No Known Problems Father        Social History     Occupational History    Not on file   Tobacco Use    Smoking status: Former Smoker     Types: Cigarettes     Quit date: 2021     Years since quittin 4    Smokeless tobacco: Never Used    Tobacco comment: 3-4 cigarettes a day   Vaping Use    Vaping Use: Never used   Substance and Sexual Activity    Alcohol use: Yes     Comment: social     Drug use: Not Currently    Sexual activity: Yes     Partners: Male     Birth control/protection: Implant       Current Outpatient Medications on File Prior to Visit   Medication Sig    etonogestrel (Nexplanon) subdermal implant 68 mg by Subdermal route once    ferrous gluconate (FERGON) 324 mg tablet Take 324 mg by mouth daily with breakfast    fluconazole (DIFLUCAN) 150 mg tablet     minocycline (MINOCIN) 100 mg capsule Take 100 mg by mouth daily    prednisoLONE acetate (PRED FORTE) 1 % ophthalmic suspension     [DISCONTINUED] cyclobenzaprine (FLEXERIL) 5 mg tablet Take 1 tablet (5 mg total) by mouth 3 (three) times a day as needed for muscle spasms    gatifloxacin (ZYMAXID) 0 5 %  (Patient not taking: Reported on 2022 )     No current facility-administered medications on file prior to visit  No Known Allergies    Physical Exam    /86   Pulse 75   Ht 5' 3" (1 6 m)   Wt 82 1 kg (181 lb)   SpO2 98%   BMI 32 06 kg/m²     Constitutional: normal, well developed, well nourished, alert, in no distress and non-toxic and no overt pain behavior    Eyes: anicteric  HEENT: grossly intact  Neck: supple, symmetric, trachea midline and no masses   Pulmonary:even and unlabored  Cardiovascular:No edema or pitting edema present  Skin:Normal without rashes or lesions and well hydrated  Psychiatric:Mood and affect appropriate  Neurologic:Cranial Nerves II-XII grossly intact  Musculoskeletal:Bilateral tenderness over thoracic paraspinals with palpable trigger points  Bilateral tenderness over cervical paraspinal and upper trapezius muscles with palpable trigger point injections      Imaging

## 2022-05-08 ENCOUNTER — APPOINTMENT (EMERGENCY)
Dept: RADIOLOGY | Facility: HOSPITAL | Age: 32
End: 2022-05-08

## 2022-05-08 ENCOUNTER — HOSPITAL ENCOUNTER (EMERGENCY)
Facility: HOSPITAL | Age: 32
Discharge: HOME | End: 2022-05-08
Attending: EMERGENCY MEDICINE
Payer: COMMERCIAL

## 2022-05-08 VITALS
BODY MASS INDEX: 34.77 KG/M2 | HEIGHT: 63 IN | TEMPERATURE: 97.8 F | HEART RATE: 66 BPM | OXYGEN SATURATION: 99 % | WEIGHT: 196.21 LBS | DIASTOLIC BLOOD PRESSURE: 81 MMHG | SYSTOLIC BLOOD PRESSURE: 112 MMHG | RESPIRATION RATE: 16 BRPM

## 2022-05-08 DIAGNOSIS — T14.8XXA ABRASION: ICD-10-CM

## 2022-05-08 DIAGNOSIS — S83.90XA KNEE SPRAIN: ICD-10-CM
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
## 2022-05-08 PROCEDURE — 90471 IMMUNIZATION ADMIN: CPT

## 2022-05-08 PROCEDURE — 73564 X-RAY EXAM KNEE 4 OR MORE: CPT

## 2022-05-08 PROCEDURE — 90715 TDAP VACCINE 7 YRS/> IM: CPT | Performed by: EMERGENCY MEDICINE

## 2022-05-08 PROCEDURE — 99284 EMERGENCY DEPT VISIT MOD MDM: CPT

## 2022-05-08 PROCEDURE — 99284 EMERGENCY DEPT VISIT MOD MDM: CPT | Performed by: EMERGENCY MEDICINE

## 2022-05-08 RX ADMIN — TETANUS TOXOID, REDUCED DIPHTHERIA TOXOID AND ACELLULAR PERTUSSIS VACCINE, ADSORBED 0.5 ML: 5; 2.5; 8; 8; 2.5 SUSPENSION INTRAMUSCULAR at 19:30
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
## 2022-05-25 ENCOUNTER — TELEPHONE (OUTPATIENT)
Dept: PAIN MEDICINE | Facility: CLINIC | Age: 32
End: 2022-05-25

## 2022-05-25 NOTE — TELEPHONE ENCOUNTER
Called patient to confirm appointment for today  Patient verbalized understanding the time and location of her appointment today  Patient did not show for appointment  Per Dr Scar Curry she will be discharged due to excessive no shows    11/24/2021 12/22/2022 03/08/2022 05/25/2022  I will update computer, if you will send letter to patient

## 2022-05-25 NOTE — TELEPHONE ENCOUNTER
Drafted discharge letter and sent to KW to sign with CC to [facility]  Does pt still need to be notified of discharge?
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
## 2022-09-21 ENCOUNTER — OFFICE VISIT (OUTPATIENT)
Dept: OBGYN CLINIC | Facility: MEDICAL CENTER | Age: 32
End: 2022-09-21
Payer: COMMERCIAL

## 2022-09-21 VITALS
DIASTOLIC BLOOD PRESSURE: 58 MMHG | WEIGHT: 161.2 LBS | HEIGHT: 63 IN | BODY MASS INDEX: 28.56 KG/M2 | SYSTOLIC BLOOD PRESSURE: 115 MMHG

## 2022-09-21 DIAGNOSIS — Z20.2 POSSIBLE EXPOSURE TO STD: Primary | ICD-10-CM

## 2022-09-21 DIAGNOSIS — R30.0 DYSURIA: ICD-10-CM

## 2022-09-21 DIAGNOSIS — N89.8 VAGINAL DISCHARGE: ICD-10-CM

## 2022-09-21 LAB
SL AMB  POCT GLUCOSE, UA: NEGATIVE
SL AMB LEUKOCYTE ESTERASE,UA: NEGATIVE
SL AMB POCT BILIRUBIN,UA: NEGATIVE
SL AMB POCT BLOOD,UA: NEGATIVE
SL AMB POCT CLARITY,UA: CLEAR
SL AMB POCT COLOR,UA: NORMAL
SL AMB POCT KETONES,UA: NEGATIVE
SL AMB POCT NITRITE,UA: NEGATIVE
SL AMB POCT PH,UA: NEGATIVE
SL AMB POCT SPECIFIC GRAVITY,UA: NEGATIVE
SL AMB POCT URINE PROTEIN: POSITIVE
SL AMB POCT UROBILINOGEN: NEGATIVE

## 2022-09-21 PROCEDURE — 87591 N.GONORRHOEAE DNA AMP PROB: CPT | Performed by: OBSTETRICS & GYNECOLOGY

## 2022-09-21 PROCEDURE — 87480 CANDIDA DNA DIR PROBE: CPT | Performed by: OBSTETRICS & GYNECOLOGY

## 2022-09-21 PROCEDURE — 87491 CHLMYD TRACH DNA AMP PROBE: CPT | Performed by: OBSTETRICS & GYNECOLOGY

## 2022-09-21 PROCEDURE — 81002 URINALYSIS NONAUTO W/O SCOPE: CPT | Performed by: OBSTETRICS & GYNECOLOGY

## 2022-09-21 PROCEDURE — 87510 GARDNER VAG DNA DIR PROBE: CPT | Performed by: OBSTETRICS & GYNECOLOGY

## 2022-09-21 PROCEDURE — 87660 TRICHOMONAS VAGIN DIR PROBE: CPT | Performed by: OBSTETRICS & GYNECOLOGY

## 2022-09-21 PROCEDURE — 99214 OFFICE O/P EST MOD 30 MIN: CPT | Performed by: OBSTETRICS & GYNECOLOGY

## 2022-09-21 NOTE — PROGRESS NOTES
Assessment:   Renaldo Sage was seen today for urinary tract infection  Diagnoses and all orders for this visit:    Possible exposure to STD  -     HIV 1/2 ANTIGEN/ANTIBODY (4TH GENERATION) [facility]; Future  -     RPR; Future  -     Hepatitis panel, acute; Future  -     Herpes I/II IgG JESSICA w Reflex to HSV-2; Future  -     VAGINOSIS DNA PROBE (AFFIRM)  -     Chlamydia/GC amplified DNA by PCR  -     POCT urine dip    Vaginal discharge  -     VAGINOSIS DNA PROBE (AFFIRM)  -     Chlamydia/GC amplified DNA by PCR  -     POCT urine dip    Dysuria  -     VAGINOSIS DNA PROBE (AFFIRM)  -     Chlamydia/GC amplified DNA by PCR  -     POCT urine dip        Plan:  1  Cultures for gonorrhea and chlamydia were collected  Affirm was obtained  2  Patient again was given blood work slip for STD testing: HIV, RPR, hepatitis and HSV  3  Urine dip in the office showed trace protein  No nitrates or LE  4  Will contact patient with all results and treat accordingly  5  Patient given contact information for a behavioral therapist in case she decides to move forward with counseling  CHIEF COMPLAINT: multiple issues  Subjective:   Lizeth Medina is a 34 y o  yo female who presents for odor, UTI symptoms and milky discharge  When she urinates, feels bladder pressure  Two weeks ago, pt had intercourse and condom broke  Partner notified pt about it after  In past week and a half, pt noticed a slight milky discharge and slight odor  Bladder hurts when bladder is full  Pt reports urgency and frequency  No itching or irritation  Pt has lost 17lb in past few months due to stress  Has been trying to stay busy in [place] to deal with her stress  ROS:   She denies hematuria, constipation, diarrhea, fevers, chills, nausea or emesis      Patient Active Problem List   Diagnosis    Occipital neuralgia of left side    Myofascial pain syndrome    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    Cervical dysplasia    Light cigarette smoker    S/P LEEP    Anxiety    Chronic pain disorder       Past Medical History:   Diagnosis Date    Abnormal Pap smear of cervix     Anxiety     Chronic pain disorder     HPV (human papilloma virus) infection     Neck pain     Osteoarthritis        Social History     Socioeconomic History    Marital status: Single     Spouse name: Not on file    Number of children: Not on file    Years of education: Not on file    Highest education level: Not on file   Occupational History    Not on file   Tobacco Use    Smoking status: Former Smoker     Types: Cigarettes     Quit date: 2021     Years since quittin 8    Smokeless tobacco: Never Used    Tobacco comment: 3-4 cigarettes a day   Vaping Use    Vaping Use: Never used   Substance and Sexual Activity    Alcohol use: Yes     Comment: social     Drug use: Not Currently    Sexual activity: Yes     Partners: Male     Birth control/protection: Implant, Condom Male   Other Topics Concern    Not on file   Social History Narrative    Not on file     Social Determinants of Health     Financial Resource Strain: Not on file   Food Insecurity: Not on file   Transportation Needs: Not on file   Physical Activity: Not on file   Stress: Not on file   Social Connections: Not on file   Intimate Partner Violence: Not on file   Housing Stability: Not on file         Current Outpatient Medications:     etonogestrel (Nexplanon) subdermal implant, 68 mg by Subdermal route once, Disp: , Rfl:     cyclobenzaprine (FLEXERIL) 5 mg tablet, Take 1 tablet (5 mg total) by mouth 3 (three) times a day as needed for muscle spasms (Patient not taking: Reported on 2022), Disp: 90 tablet, Rfl: 5    ferrous gluconate (FERGON) 324 mg tablet, Take 324 mg by mouth daily with breakfast (Patient not taking: Reported on 2022), Disp: , Rfl:     fluconazole (DIFLUCAN) 150 mg tablet, , Disp: , Rfl:     gatifloxacin (ZYMAXID) 0 5 %, , Disp: , Rfl:    minocycline (MINOCIN) 100 mg capsule, Take 100 mg by mouth daily (Patient not taking: Reported on 9/21/2022), Disp: , Rfl:     prednisoLONE acetate (PRED FORTE) 1 % ophthalmic suspension, , Disp: , Rfl:     No Known Allergies    /58   Ht 5' 3" (1 6 m)   Wt 73 1 kg (161 lb 3 2 oz)   LMP  (LMP Unknown)   BMI 28 56 kg/m²     GEN: The patient was alert and oriented x3, pleasant well-appearing female in no acute distress  Tearful  Pelvic: Normal appearing external female genitalia, normal vaginal epithelium, normalappearing cervix  positive discharge noted, thin whitish discharge

## 2022-09-22 ENCOUNTER — TELEPHONE (OUTPATIENT)
Dept: OBGYN CLINIC | Facility: MEDICAL CENTER | Age: 32
End: 2022-09-22

## 2022-09-23 DIAGNOSIS — B96.89 BACTERIAL VAGINOSIS: Primary | ICD-10-CM

## 2022-09-23 DIAGNOSIS — N76.0 BACTERIAL VAGINOSIS: Primary | ICD-10-CM

## 2022-09-23 RX ORDER — METRONIDAZOLE 500 MG/1
500 TABLET ORAL EVERY 12 HOURS SCHEDULED
Qty: 14 TABLET | Refills: 0 | Status: SHIPPED | OUTPATIENT
Start: 2022-09-23 | End: 2022-09-30

## 2023-01-19 ENCOUNTER — OFFICE VISIT (OUTPATIENT)
Dept: DENTISTRY | Facility: CLINIC | Age: 33
End: 2023-01-19

## 2023-01-19 VITALS — TEMPERATURE: 97.8 F | DIASTOLIC BLOOD PRESSURE: 74 MMHG | HEART RATE: 65 BPM | SYSTOLIC BLOOD PRESSURE: 125 MMHG

## 2023-01-19 DIAGNOSIS — Z01.20 ENCOUNTER FOR DENTAL EXAMINATION: Primary | ICD-10-CM

## 2023-01-19 PROBLEM — K05.30 PERIODONTITIS: Status: ACTIVE | Noted: 2023-01-19

## 2023-01-19 NOTE — PROGRESS NOTES
Comprehensive Exam, [facility]     Janis Giraldo presents for a comprehensive exam  Verbal consent for treatment given in addition to the forms  Reviewed health history - Patient is ASA    Consents signed: Yes    Perio: Normal, Generalized and Slight bleeding  Pain Scale: 0  Caries Assessment: medium  Radiographs:FMX  Oral Hygiene instruction reviewed and given  Hygiene recall visits recommended to the patient  Patient stayed for prophy appointment ( had a cancellation)   Hygiene fair, light/moderate marginal calculus ( used cavitron) review [place]   Recommend floss daily and mouthwash afterwards would help  Patient has 2 tongue piercings     Treatment Plan:  1  Infection control  2  Periodontal therapy: Prophy   3  Caries control: Restorative  #30 post & core crown ( pre- auth)  #31 DOF   4  Occlusal evaluation    Prognosis is Good    Referrals needed: No  Next visit:6[facility]C   NV: 2 #31 DOF     Exam by Dr Kennedy Nevarez

## 2023-03-03 ENCOUNTER — OFFICE VISIT (OUTPATIENT)
Dept: DENTISTRY | Facility: CLINIC | Age: 33
End: 2023-03-03

## 2023-03-03 VITALS — TEMPERATURE: 97.8 F

## 2023-03-03 DIAGNOSIS — Z01.21 ENCOUNTER FOR DENTAL EXAMINATION AND CLEANING WITH ABNORMAL FINDINGS: Primary | ICD-10-CM

## 2023-03-29 NOTE — PROGRESS NOTES
John Choe came for Tr planning discussion regarding LR side  Med hx rvd: no changes, pt has hx of myofascial pain syndrome, HPV, cerv dysplasia (pt  Takes meds)  NKDA   Pain level: none   o/e:   #30 rctreated, (CHECK PA)  adv p/c, ceramic crown  #31 fractured fill, decay, visible, adv fill (), may need crown in the future  pt understood tr plan, and #31 needs to be restored or may fracture  Pt  Left the off comf       Nv: #31

## 2023-07-10 ENCOUNTER — TELEPHONE (OUTPATIENT)
Dept: OBGYN CLINIC | Facility: MEDICAL CENTER | Age: 33
End: 2023-07-10

## 2023-07-10 NOTE — TELEPHONE ENCOUNTER
PT called to cancel today's appt and rescheduled for tomorrow. Unfortunately we do not have any availability. PT advised to call back tomorrow to see if we have cancellations.

## 2023-08-04 ENCOUNTER — APPOINTMENT (OUTPATIENT)
Dept: LAB | Facility: MEDICAL CENTER | Age: 33
End: 2023-08-04
Payer: COMMERCIAL

## 2023-08-04 ENCOUNTER — ANNUAL EXAM (OUTPATIENT)
Dept: OBGYN CLINIC | Facility: MEDICAL CENTER | Age: 33
End: 2023-08-04
Payer: COMMERCIAL

## 2023-08-04 VITALS
HEIGHT: 63 IN | SYSTOLIC BLOOD PRESSURE: 122 MMHG | WEIGHT: 178 LBS | BODY MASS INDEX: 31.54 KG/M2 | DIASTOLIC BLOOD PRESSURE: 80 MMHG

## 2023-08-04 DIAGNOSIS — Z98.890 HISTORY OF LOOP ELECTRICAL EXCISION PROCEDURE (LEEP): ICD-10-CM

## 2023-08-04 DIAGNOSIS — Z01.419 ENCOUNTER FOR WELL WOMAN EXAM WITH ROUTINE GYNECOLOGICAL EXAM: Primary | ICD-10-CM

## 2023-08-04 DIAGNOSIS — Z11.3 ROUTINE SCREENING FOR STI (SEXUALLY TRANSMITTED INFECTION): ICD-10-CM

## 2023-08-04 DIAGNOSIS — N92.1 BREAKTHROUGH BLEEDING ON NEXPLANON: ICD-10-CM

## 2023-08-04 DIAGNOSIS — B96.89 BACTERIAL VAGINOSIS: ICD-10-CM

## 2023-08-04 DIAGNOSIS — Z86.2 HISTORY OF ANEMIA: ICD-10-CM

## 2023-08-04 DIAGNOSIS — N76.0 BACTERIAL VAGINOSIS: ICD-10-CM

## 2023-08-04 DIAGNOSIS — Z97.5 BREAKTHROUGH BLEEDING ON NEXPLANON: ICD-10-CM

## 2023-08-04 DIAGNOSIS — Z01.419 ENCOUNTER FOR WELL WOMAN EXAM WITH ROUTINE GYNECOLOGICAL EXAM: ICD-10-CM

## 2023-08-04 LAB
ERYTHROCYTE [DISTWIDTH] IN BLOOD BY AUTOMATED COUNT: 12.7 % (ref 11.6–15.1)
FERRITIN SERPL-MCNC: 84 NG/ML (ref 11–307)
HBV SURFACE AG SER QL: NORMAL
HCT VFR BLD AUTO: 41.3 % (ref 34.8–46.1)
HGB BLD-MCNC: 13.5 G/DL (ref 11.5–15.4)
HIV 1+2 AB+HIV1 P24 AG SERPL QL IA: NORMAL
HIV 2 AB SERPL QL IA: NORMAL
HIV1 AB SERPL QL IA: NORMAL
HIV1 P24 AG SERPL QL IA: NORMAL
IRON SATN MFR SERPL: 17 % (ref 15–50)
IRON SERPL-MCNC: 75 UG/DL (ref 50–170)
MCH RBC QN AUTO: 32 PG (ref 26.8–34.3)
MCHC RBC AUTO-ENTMCNC: 32.7 G/DL (ref 31.4–37.4)
MCV RBC AUTO: 98 FL (ref 82–98)
PLATELET # BLD AUTO: 259 THOUSANDS/UL (ref 149–390)
PMV BLD AUTO: 11.5 FL (ref 8.9–12.7)
RBC # BLD AUTO: 4.22 MILLION/UL (ref 3.81–5.12)
TIBC SERPL-MCNC: 443 UG/DL (ref 250–450)
TREPONEMA PALLIDUM IGG+IGM AB [PRESENCE] IN SERUM OR PLASMA BY IMMUNOASSAY: NORMAL
WBC # BLD AUTO: 8.63 THOUSAND/UL (ref 4.31–10.16)

## 2023-08-04 PROCEDURE — 83550 IRON BINDING TEST: CPT

## 2023-08-04 PROCEDURE — 36415 COLL VENOUS BLD VENIPUNCTURE: CPT

## 2023-08-04 PROCEDURE — 87389 HIV-1 AG W/HIV-1&-2 AB AG IA: CPT

## 2023-08-04 PROCEDURE — 85027 COMPLETE CBC AUTOMATED: CPT

## 2023-08-04 PROCEDURE — 82728 ASSAY OF FERRITIN: CPT

## 2023-08-04 PROCEDURE — G0476 HPV COMBO ASSAY CA SCREEN: HCPCS | Performed by: STUDENT IN AN ORGANIZED HEALTH CARE EDUCATION/TRAINING PROGRAM

## 2023-08-04 PROCEDURE — 99395 PREV VISIT EST AGE 18-39: CPT | Performed by: STUDENT IN AN ORGANIZED HEALTH CARE EDUCATION/TRAINING PROGRAM

## 2023-08-04 PROCEDURE — G0145 SCR C/V CYTO,THINLAYER,RESCR: HCPCS | Performed by: STUDENT IN AN ORGANIZED HEALTH CARE EDUCATION/TRAINING PROGRAM

## 2023-08-04 PROCEDURE — 87340 HEPATITIS B SURFACE AG IA: CPT

## 2023-08-04 PROCEDURE — 86780 TREPONEMA PALLIDUM: CPT

## 2023-08-04 PROCEDURE — 83540 ASSAY OF IRON: CPT

## 2023-08-04 RX ORDER — METRONIDAZOLE 500 MG/1
500 TABLET ORAL EVERY 12 HOURS SCHEDULED
Qty: 14 TABLET | Refills: 0 | Status: SHIPPED | OUTPATIENT
Start: 2023-08-04 | End: 2023-08-11

## 2023-08-04 RX ORDER — LEVONORGESTREL AND ETHINYL ESTRADIOL 0.15-0.03
1 KIT ORAL DAILY
Qty: 90 TABLET | Refills: 3 | Status: SHIPPED | OUTPATIENT
Start: 2023-08-04 | End: 2024-08-03

## 2023-08-04 NOTE — PROGRESS NOTES
[facility] [address] [place] [address]    ASSESSMENT/PLAN: Anusha Lancaster is a 28 y.o. [id number] who presents for annual gynecologic exam.    Encounter for routine gynecologic examination  - Routine well woman exam completed today. - Cervical Cancer Screening: Current ASCCP Guidelines reviewed. Last Pap: H/o JAREK 3 s/p LEEP in 2021. Co-testing negative in 2022. Co-testing performed today. - HPV Vaccination status: Immunization series complete  - STI screening offered including HIV testing: Ordered. - Contraceptive counseling discussed. Current contraception: Nexplanon but having bothersome breakthrough bleeding. Discussed the approach of adding a pill temporarily to address breakthrough bleeding. Additional problems addressed during this visit:  1. Encounter for well woman exam with routine gynecological exam  -     Liquid-based pap, screening  -     CBC and Platelet; Future  -     Iron Panel (Includes Ferritin, Iron Sat%, Iron, and TIBC); Future    2. Routine screening for STI (sexually transmitted infection)  -     HIV 1/2 AG/AB w Reflex [facility] for 2 yr old and above; Future  -     Hepatitis B surface antigen; Future  -     RPR-Syphilis Screening (Total Syphilis IGG/IGM); Future    3. History of loop electrical excision procedure (LEEP)    4. Bacterial vaginosis  -     metroNIDAZOLE (FLAGYL) 500 mg tablet; Take 1 tablet (500 mg total) by mouth every 12 (twelve) hours for 7 days    5. Breakthrough bleeding on Nexplanon  -     levonorgestrel-ethinyl estradiol (Iclevia) 0.15-0.03 MG per tablet; Take 1 tablet by mouth daily        CC:  Annual Gynecologic Examination    HPI: Anusha Lancaster is a 28 y.o. [id number] who presents for annual gynecologic examination. She reports  no new changes to her health. She reports no breast concerns. She gets irregular periods with prolonged spotting on nexplanon.  She has no vaginal discharge, vulvar or vaginal lesions, pelvic pain, or abnormal bleeding. She reports vaginal odor. She has no sexual health concerns and is currently sexually active with one male partner. She contracepts with Nexplanon. She has no symptoms of pelvic organ prolapse, urinary, or fecal incontinence. She denies intimate partner violence. The following portions of the patient's history were reviewed and updated as appropriate: allergies, current medications, past family history, past medical history, obstetric history, gynecologic history, past social history, past surgical history and problem list.    Review of Systems   Constitutional: Negative for chills and fever. Respiratory: Negative for cough and shortness of breath. Cardiovascular: Negative for chest pain and leg swelling. Gastrointestinal: Negative for abdominal pain, nausea and vomiting. Genitourinary: Negative for dysuria, frequency and urgency. Neurological: Negative for dizziness, light-headedness and headaches. Objective:  /80   Ht 5' 3" (1.6 m)   Wt 80.7 kg (178 lb)   LMP 07/28/2023 (Exact Date)   BMI 31.53 kg/m²    Physical Exam  Chaperone present: chaparone offered, patient declined. Constitutional:       Appearance: Normal appearance. HENT:      Head: Normocephalic and atraumatic. Cardiovascular:      Rate and Rhythm: Normal rate. Pulmonary:      Effort: Pulmonary effort is normal.   Chest:   Breasts:     Breasts are symmetrical.      Right: Normal. No swelling, bleeding, inverted nipple, mass, nipple discharge, skin change or tenderness. Left: Normal. No swelling, bleeding, inverted nipple, mass, nipple discharge, skin change or tenderness. Abdominal:      General: There is no distension. Tenderness: There is no abdominal tenderness. There is no guarding. Genitourinary:     Exam position: Lithotomy position. Pubic Area: No rash. Labia:         Right: No rash, tenderness or lesion. Left: No rash, tenderness or lesion. Urethra: No prolapse, urethral swelling or urethral lesion. Vagina: Vaginal discharge (BV) present. No erythema, tenderness, bleeding or lesions. Cervix: No cervical motion tenderness, discharge, friability or erythema. Uterus: Not enlarged, not tender and no uterine prolapse. Adnexa:         Right: No mass, tenderness or fullness. Left: No mass, tenderness or fullness. Lymphadenopathy:      Upper Body:      Right upper body: No axillary adenopathy. Left upper body: No axillary adenopathy. Lower Body: No right inguinal adenopathy. No left inguinal adenopathy. Neurological:      Mental Status: She is alert.

## 2023-08-10 ENCOUNTER — TELEPHONE (OUTPATIENT)
Dept: OBGYN CLINIC | Facility: MEDICAL CENTER | Age: 33
End: 2023-08-10

## 2023-08-10 NOTE — TELEPHONE ENCOUNTER
Pt called, wanting to review results. I let her know that the provider review all of them and they're all normal, we went one by one. Even though still will like Clinical Team to contact her to review them with her. Also advice to set up her Chart.

## 2023-08-10 NOTE — TELEPHONE ENCOUNTER
Pt is aware of the results at this time.  Aware pap results are still in process and we will reach out once received and reviewed

## 2023-08-11 LAB
LAB AP GYN PRIMARY INTERPRETATION: NORMAL
Lab: NORMAL
PATH INTERP SPEC-IMP: NORMAL

## 2023-09-23 ENCOUNTER — APPOINTMENT (OUTPATIENT)
Dept: CT IMAGING | Facility: HOSPITAL | Age: 33
End: 2023-09-23
Payer: COMMERCIAL

## 2023-09-23 PROCEDURE — 70496 CT ANGIOGRAPHY HEAD: CPT

## 2023-09-23 PROCEDURE — 70498 CT ANGIOGRAPHY NECK: CPT

## 2023-09-23 PROCEDURE — 74177 CT ABD & PELVIS W/CONTRAST: CPT

## 2023-09-23 PROCEDURE — 71260 CT THORAX DX C+: CPT

## 2023-09-24 ENCOUNTER — HOSPITAL ENCOUNTER (EMERGENCY)
Facility: HOSPITAL | Age: 33
Discharge: HOME | End: 2023-09-24
Attending: SURGERY
Payer: COMMERCIAL

## 2023-09-24 VITALS
RESPIRATION RATE: 16 BRPM | SYSTOLIC BLOOD PRESSURE: 115 MMHG | DIASTOLIC BLOOD PRESSURE: 70 MMHG | OXYGEN SATURATION: 97 % | WEIGHT: 184.53 LBS | HEART RATE: 67 BPM | TEMPERATURE: 97.8 F
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
## 2023-09-24 LAB
BASE EXCESS BLDA CALC-SCNC: -1 MMOL/L (ref -2–3)
CA-I BLD-SCNC: 1.28 MMOL/L (ref 1.12–1.32)
GLUCOSE SERPL-MCNC: 99 MG/DL (ref 65–140)
HCO3 BLDA-SCNC: 24.1 MMOL/L (ref 24–30)
HCT VFR BLD CALC: 40 % (ref 34.8–46.1)
HGB BLDA-MCNC: 13.6 G/DL (ref 11.5–15.4)
PCO2 BLD: 25 MMOL/L (ref 21–32)
PCO2 BLD: 39.4 MM HG (ref 42–50)
PH BLD: 7.39 [PH] (ref 7.3–7.4)
PO2 BLD: 53 MM HG (ref 35–45)
POTASSIUM BLD-SCNC: 4 MMOL/L (ref 3.5–5.3)
SAO2 % BLD FROM PO2: 87 % (ref 60–85)
SODIUM BLD-SCNC: 139 MMOL/L (ref 136–145)
SPECIMEN SOURCE: ABNORMAL

## 2023-09-24 PROCEDURE — EDAIR PR ED AIR: Performed by: EMERGENCY MEDICINE

## 2023-09-24 PROCEDURE — 93005 ELECTROCARDIOGRAM TRACING: CPT

## 2023-09-24 PROCEDURE — 84132 ASSAY OF SERUM POTASSIUM: CPT

## 2023-09-24 PROCEDURE — 82947 ASSAY GLUCOSE BLOOD QUANT: CPT

## 2023-09-24 PROCEDURE — 84295 ASSAY OF SERUM SODIUM: CPT

## 2023-09-24 PROCEDURE — 85014 HEMATOCRIT: CPT

## 2023-09-24 PROCEDURE — 82803 BLOOD GASES ANY COMBINATION: CPT

## 2023-09-24 PROCEDURE — 99285 EMERGENCY DEPT VISIT HI MDM: CPT

## 2023-09-24 PROCEDURE — 82330 ASSAY OF CALCIUM: CPT

## 2023-09-24 RX ADMIN — IOHEXOL 100 ML: 350 INJECTION, SOLUTION INTRAVENOUS at 00:23
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
## 2023-09-24 NOTE — INCIDENTAL FINDINGS
The following findings require follow up:  Radiographic finding   Finding:    One or more sharply circumscribed subcentimeter renal hypodensities are present, too small to accurately characterize, and statistically most likely benign findings. According to recent literature (Radiology 2019) no further workup of these findings is recommended. Findings and follow up discussed with patient. Patient verbalized understanding.       Follow up should be done within 1 month(s)    Please notify the following clinician to assist with the follow up:   Dr. Roxanne Thomas

## 2023-09-24 NOTE — DISCHARGE INSTR - AVS FIRST PAGE
Trauma Discharge Instructions:    Please follow-up as instructed. If you need a follow-up appointment, please call the office when you leave to schedule an appointment. Activity:  - You may resume activity as tolerated. - Walking and normal light activities are encouraged. - Normal daily activities including climbing steps are okay. Return to work:    - You may return to work. Diet:    - You may resume your normal diet. Medications:  - You should continue your current medication regimen after discharge unless otherwise instructed. Please refer to your discharge medication list for further details. Additional Instructions:  - May shower daily.  - If you have any questions or concerns after discharge please call the office.  - Call office or return to ER if fever greater than 101, chills, persistent nausea/vomiting, worsening/uncontrollable pain, develop productive cough, increasing shortness of breath, difficulty breathing, and/or increasing redness or purulent/foul smelling drainage from incision(s).
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
## 2023-09-25 LAB
ATRIAL RATE: 74 BPM
P AXIS: 77 DEGREES
PR INTERVAL: 154 MS
QRS AXIS: 78 DEGREES
QRSD INTERVAL: 78 MS
QT INTERVAL: 378 MS
QTC INTERVAL: 419 MS
T WAVE AXIS: 62 DEGREES
VENTRICULAR RATE: 74 BPM

## 2023-11-13 DIAGNOSIS — N92.1 BREAKTHROUGH BLEEDING ON NEXPLANON: ICD-10-CM

## 2023-11-13 DIAGNOSIS — Z97.5 BREAKTHROUGH BLEEDING ON NEXPLANON: ICD-10-CM

## 2023-11-13 RX ORDER — LEVONORGESTREL AND ETHINYL ESTRADIOL 0.15-0.03
1 KIT ORAL DAILY
Qty: 90 TABLET | Refills: 3 | Status: SHIPPED | OUTPATIENT
Start: 2023-11-13 | End: 2024-11-12

## 2023-12-20 DIAGNOSIS — B96.89 BACTERIAL VAGINOSIS: Primary | ICD-10-CM

## 2023-12-20 DIAGNOSIS — N76.0 BACTERIAL VAGINOSIS: Primary | ICD-10-CM

## 2023-12-21 RX ORDER — METRONIDAZOLE 500 MG/1
500 TABLET ORAL EVERY 12 HOURS SCHEDULED
Qty: 14 TABLET | Refills: 0 | Status: SHIPPED | OUTPATIENT
Start: 2023-12-21 | End: 2023-12-28

## 2024-01-08 ENCOUNTER — OFFICE VISIT (OUTPATIENT)
Dept: OBGYN CLINIC | Facility: MEDICAL CENTER | Age: 34
End: 2024-01-08
Payer: COMMERCIAL

## 2024-01-08 VITALS — WEIGHT: 184.2 LBS | DIASTOLIC BLOOD PRESSURE: 68 MMHG | SYSTOLIC BLOOD PRESSURE: 120 MMHG | BODY MASS INDEX: 32.63 KG/M2

## 2024-01-08 DIAGNOSIS — N76.0 RECURRENT VAGINITIS: Primary | ICD-10-CM

## 2024-01-08 LAB
BV WHIFF TEST VAG QL: ABNORMAL
CLUE CELLS SPEC QL WET PREP: ABNORMAL
PH SMN: 5 [PH]
SL AMB POCT WET MOUNT: ABNORMAL
T VAGINALIS VAG QL WET PREP: ABNORMAL
YEAST VAG QL WET PREP: ABNORMAL

## 2024-01-08 PROCEDURE — 99214 OFFICE O/P EST MOD 30 MIN: CPT | Performed by: OBSTETRICS & GYNECOLOGY

## 2024-01-08 PROCEDURE — 87491 CHLMYD TRACH DNA AMP PROBE: CPT | Performed by: OBSTETRICS & GYNECOLOGY

## 2024-01-08 PROCEDURE — 87591 N.GONORRHOEAE DNA AMP PROB: CPT | Performed by: OBSTETRICS & GYNECOLOGY

## 2024-01-08 PROCEDURE — 87210 SMEAR WET MOUNT SALINE/INK: CPT | Performed by: OBSTETRICS & GYNECOLOGY

## 2024-01-08 RX ORDER — FLUCONAZOLE 150 MG/1
150 TABLET ORAL
Qty: 2 TABLET | Refills: 2 | Status: SHIPPED | OUTPATIENT
Start: 2024-01-08 | End: 2024-01-12

## 2024-01-08 RX ORDER — METRONIDAZOLE 7.5 MG/G
1 GEL VAGINAL 2 TIMES WEEKLY
Qty: 70 G | Refills: 3 | Status: SHIPPED | OUTPATIENT
Start: 2024-01-08 | End: 2024-07-08

## 2024-01-08 RX ORDER — METRONIDAZOLE 500 MG/1
500 TABLET ORAL EVERY 12 HOURS SCHEDULED
Qty: 14 TABLET | Refills: 0 | Status: SHIPPED | OUTPATIENT
Start: 2024-01-08 | End: 2024-01-15

## 2024-01-08 NOTE — PROGRESS NOTES
[facility] [address], [place], [place]    Assessment/Plan:  No problem-specific Assessment & Plan notes found for this encounter.    Diagnoses and all orders for this visit:    Recurrent vaginitis  -     POCT wet mount  -     fluconazole (DIFLUCAN) 150 mg tablet; Take 1 tablet (150 mg total) by mouth every 3 (three) days for 2 doses  -     metroNIDAZOLE (METROGEL) 0.75 % vaginal gel; Insert 1 Application into the vagina 2 (two) times a week For 6 months  -     metroNIDAZOLE (FLAGYL) 500 mg tablet; Take 1 tablet (500 mg total) by mouth every 12 (twelve) hours for 7 days          Subjective:   Ramon Altamirano is a 33 y.o.  female.  CC: recurrent vaginitis    HPI: HPI  Patient presents with complaints of recurrent vaginal infection. She states she was recently diagnosed and treated for BV. She reports increased vaginal odor. Has been having recurrent infections.   Discussed using Boric acid for 1 month after being treated for BV. Followed by suppression for 6 months.   Patient should follow up to check that the infection has cleared after her course of Boric Acid.    ROS: Review of Systems   Constitutional: Negative.    Respiratory: Negative.     Cardiovascular: Negative.    Gastrointestinal: Negative.    Genitourinary:         Vaginal odor   Musculoskeletal: Negative.    All other systems reviewed and are negative.      PFSH: The following portions of the patient's history were reviewed and updated as appropriate: allergies, current medications, past family history, past medical history, obstetric history, gynecologic history, past social history, past surgical history and problem list.       Objective:  /68   Wt 83.6 kg (184 lb 3.2 oz)   BMI 32.63 kg/m²    Physical Exam  Vitals reviewed.   Constitutional:       Appearance: Normal appearance.   Cardiovascular:      Rate and Rhythm: Normal rate.   Pulmonary:      Effort: Pulmonary effort is normal. No respiratory  distress.   Genitourinary:     General: Normal vulva.      Exam position: Lithotomy position.      Labia:         Right: No lesion.         Left: No lesion.       Vagina: Vaginal discharge present.      Cervix: Normal.      Uterus: Normal.    Neurological:      Mental Status: She is alert.   Psychiatric:         Mood and Affect: Mood normal.         Behavior: Behavior normal.

## 2024-01-09 LAB
C TRACH DNA SPEC QL NAA+PROBE: NEGATIVE
N GONORRHOEA DNA SPEC QL NAA+PROBE: NEGATIVE

## 2024-02-28 DIAGNOSIS — Z97.5 BREAKTHROUGH BLEEDING ON NEXPLANON: ICD-10-CM

## 2024-02-28 DIAGNOSIS — N92.1 BREAKTHROUGH BLEEDING ON NEXPLANON: ICD-10-CM

## 2024-02-28 RX ORDER — LEVONORGESTREL AND ETHINYL ESTRADIOL 0.15-0.03
1 KIT ORAL DAILY
Qty: 90 TABLET | Refills: 3 | Status: SHIPPED | OUTPATIENT
Start: 2024-02-28 | End: 2025-02-27

## 2024-02-28 NOTE — TELEPHONE ENCOUNTER
Patient called requesting a refill on her BC. I reviewed in chart   levonorgestrel-ethinyl estradiol   Please send to  [facility] - [address] [place] [phone]     Patient does have an appointment on 3/1 at 2pm with Dr. Donnelly

## 2024-02-28 NOTE — TELEPHONE ENCOUNTER
Patient called stating that she has been calling our office and not receiving any calls back. She is upset and wants to voice her complaints to administration. Please contact patient back at  [phone]

## 2024-03-07 RX ORDER — METRONIDAZOLE 500 MG/1
500 TABLET ORAL EVERY 12 HOURS SCHEDULED
Qty: 10 TABLET | Refills: 0 | Status: CANCELLED | OUTPATIENT
Start: 2024-03-07 | End: 2024-03-12

## 2024-03-07 NOTE — PROGRESS NOTES
Was treated for bv with metrogel   Since starting having some cottage cheese and itching     Recommend to stop the metrogel now  Cultures   Lotrisone cream

## 2024-03-08 ENCOUNTER — OFFICE VISIT (OUTPATIENT)
Dept: OBGYN CLINIC | Facility: MEDICAL CENTER | Age: 34
End: 2024-03-08
Payer: COMMERCIAL

## 2024-03-08 VITALS — BODY MASS INDEX: 32.14 KG/M2 | HEIGHT: 63 IN | WEIGHT: 181.4 LBS

## 2024-03-08 DIAGNOSIS — B37.2 YEAST DERMATITIS: Primary | ICD-10-CM

## 2024-03-08 PROCEDURE — 99212 OFFICE O/P EST SF 10 MIN: CPT | Performed by: OBSTETRICS & GYNECOLOGY

## 2024-03-08 PROCEDURE — 81514 NFCT DS BV&VAGINITIS DNA ALG: CPT | Performed by: OBSTETRICS & GYNECOLOGY

## 2024-03-08 RX ORDER — CLOTRIMAZOLE AND BETAMETHASONE DIPROPIONATE 10; .64 MG/G; MG/G
CREAM TOPICAL 2 TIMES DAILY
Qty: 45 G | Refills: 3 | Status: SHIPPED | OUTPATIENT
Start: 2024-03-08

## 2024-03-09 LAB
C GLABRATA DNA VAG QL NAA+PROBE: NEGATIVE
C KRUSEI DNA VAG QL NAA+PROBE: NEGATIVE
CANDIDA SP 6 PNL VAG NAA+PROBE: NEGATIVE
T VAGINALIS DNA VAG QL NAA+PROBE: NEGATIVE
VAGINOSIS/ITIS DNA PNL VAG PROBE+SIG AMP: NEGATIVE

## 2024-03-11 ENCOUNTER — TELEPHONE (OUTPATIENT)
Dept: OBGYN CLINIC | Facility: MEDICAL CENTER | Age: 34
End: 2024-03-11

## 2024-03-11 NOTE — TELEPHONE ENCOUNTER
Called patient's insurance to check if PA is needed for Nexplanon. Per patient's insurance,  no PA is needed for the following codes -    Codes: , 91329, 06715    Representative: Azeb PALACIOS     Reference #[id number] (Representative name and current date)

## 2024-03-12 ENCOUNTER — TELEPHONE (OUTPATIENT)
Age: 34
End: 2024-03-12

## 2024-03-12 NOTE — TELEPHONE ENCOUNTER
Patient called into office to review test results.  Advised patient provider has not yet reviewed them.      Message sent to ordering provider

## 2024-06-19 DIAGNOSIS — Z97.5 BREAKTHROUGH BLEEDING ON NEXPLANON: ICD-10-CM

## 2024-06-19 DIAGNOSIS — N92.1 BREAKTHROUGH BLEEDING ON NEXPLANON: ICD-10-CM

## 2024-06-19 RX ORDER — LEVONORGESTREL AND ETHINYL ESTRADIOL 0.15-0.03
1 KIT ORAL DAILY
Qty: 90 TABLET | Refills: 0 | Status: SHIPPED | OUTPATIENT
Start: 2024-06-19 | End: 2024-09-17

## 2024-06-19 NOTE — TELEPHONE ENCOUNTER
Medication: levonorgestrel-ethinyl estradiol (lclevia)    Dose/Frequency: 0.15-0.03 MG per tablet - Take 1 tablet by mouth daily     Quantity: 90 tablet     Pharmacy:  [facility] - [address] [place] [address]     Office:   [x] PCP/Provider -   [x] Speciality/Provider - Delilah Valdez MD     Does the patient have enough for 3 days?   [] Yes   [x] No - Send as HP to POD

## 2024-09-21 ENCOUNTER — NURSE TRIAGE (OUTPATIENT)
Dept: OTHER | Facility: OTHER | Age: 34
End: 2024-09-21

## 2024-09-21 DIAGNOSIS — Z97.5 BREAKTHROUGH BLEEDING ON NEXPLANON: ICD-10-CM

## 2024-09-21 DIAGNOSIS — N92.1 BREAKTHROUGH BLEEDING ON NEXPLANON: ICD-10-CM

## 2024-09-21 NOTE — TELEPHONE ENCOUNTER
"Regarding: Birth Control  ----- Message from Monse MILES sent at 9/21/2024  9:28 AM EDT -----  \"I need my birth control refilled before 2pm. My pharmacy closes soon and I am leaving on a trip tomorrow. I am completely out of birth control\"    "

## 2024-09-21 NOTE — TELEPHONE ENCOUNTER
Reason for Disposition   Needs refill of birth control pills (BCPs)    Answer Assessment - Initial Assessment Questions  Patient calling to request levonorgestrel-ethinyl estradiol (Iclevia) 0.15-0.03 MG per tablet () before leaving for vacation on Monday morning.     Informed patient of the policy on weekend refills. Patient requesting to speak with on call Provider.     On call Provider paged.    Protocols used: Contraception - Birth Control Pills - Combined-Adult-AH    On call Provider paged per request. Patient is not up to date on annual and physician deferred management to patient's physician. Informed patient and explained call will be routed to the office for Monday morning.

## 2024-09-23 ENCOUNTER — TELEPHONE (OUTPATIENT)
Dept: OTHER | Facility: HOSPITAL | Age: 34
End: 2024-09-23

## 2024-09-23 RX ORDER — LEVONORGESTREL / ETHINYL ESTRADIOL 0.15-0.03
1 KIT ORAL DAILY
Qty: 91 TABLET | Refills: 0 | Status: SHIPPED | OUTPATIENT
Start: 2024-09-23

## 2024-09-23 NOTE — TELEPHONE ENCOUNTER
Patient called for refill of Jolessa birth control- which was sent to the pharmacy at 9:34 am today   Patient will call pharmacy

## 2025-01-08 DIAGNOSIS — N92.1 BREAKTHROUGH BLEEDING ON NEXPLANON: ICD-10-CM

## 2025-01-08 DIAGNOSIS — Z97.5 BREAKTHROUGH BLEEDING ON NEXPLANON: ICD-10-CM

## 2025-01-08 NOTE — TELEPHONE ENCOUNTER
**Pt has yearly scheduled 1/23/25    Medication: Jolessa 0.15-0.03 MG per tablet    Dose/Frequency: 1 tablet, Oral, Daily     Quantity: 91 tablet     Pharmacy:  [facility] - [address] [place] [address]    Office:   [] PCP/Provider -   [x] Speciality/Provider -     Does the patient have enough for 3 days?   [x] Yes   [] No - Send as HP to POD

## 2025-01-09 RX ORDER — LEVONORGESTREL AND ETHINYL ESTRADIOL 0.15-0.03
1 KIT ORAL DAILY
Qty: 91 TABLET | Refills: 0 | Status: SHIPPED | OUTPATIENT
Start: 2025-01-09

## 2025-01-10 ENCOUNTER — TELEPHONE (OUTPATIENT)
Age: 35
End: 2025-01-10

## 2025-01-10 NOTE — TELEPHONE ENCOUNTER
FYI - Patient called, requested Yearly (last exam 23 with Dr Lockhart) & Nexplanon removal appt. Explained these are 2 separate appts since Nexplanon is considered a procedure.     Pt has seen multiple providers in office, requested first available with anyone for Nexplanon removal since it is  (inserted 21). Pt scheduled 2/3/25 with Dr Diaz & placed on waitlist

## 2025-01-23 ENCOUNTER — ANNUAL EXAM (OUTPATIENT)
Dept: OBGYN CLINIC | Facility: MEDICAL CENTER | Age: 35
End: 2025-01-23
Payer: COMMERCIAL

## 2025-01-23 VITALS
HEIGHT: 63 IN | SYSTOLIC BLOOD PRESSURE: 118 MMHG | WEIGHT: 198 LBS | DIASTOLIC BLOOD PRESSURE: 70 MMHG | BODY MASS INDEX: 35.08 KG/M2

## 2025-01-23 DIAGNOSIS — B96.89 BACTERIAL VAGINOSIS: ICD-10-CM

## 2025-01-23 DIAGNOSIS — N76.0 BACTERIAL VAGINOSIS: ICD-10-CM

## 2025-01-23 DIAGNOSIS — Z01.419 ENCOUNTER FOR GYNECOLOGICAL EXAMINATION: Primary | ICD-10-CM

## 2025-01-23 DIAGNOSIS — N92.1 BREAKTHROUGH BLEEDING ON NEXPLANON: ICD-10-CM

## 2025-01-23 DIAGNOSIS — Z97.5 BREAKTHROUGH BLEEDING ON NEXPLANON: ICD-10-CM

## 2025-01-23 DIAGNOSIS — N76.0 RECURRENT VAGINITIS: ICD-10-CM

## 2025-01-23 PROCEDURE — G0145 SCR C/V CYTO,THINLAYER,RESCR: HCPCS | Performed by: STUDENT IN AN ORGANIZED HEALTH CARE EDUCATION/TRAINING PROGRAM

## 2025-01-23 PROCEDURE — G0476 HPV COMBO ASSAY CA SCREEN: HCPCS | Performed by: STUDENT IN AN ORGANIZED HEALTH CARE EDUCATION/TRAINING PROGRAM

## 2025-01-23 PROCEDURE — 99395 PREV VISIT EST AGE 18-39: CPT | Performed by: STUDENT IN AN ORGANIZED HEALTH CARE EDUCATION/TRAINING PROGRAM

## 2025-01-23 RX ORDER — LEVONORGESTREL AND ETHINYL ESTRADIOL 0.15-0.03
1 KIT ORAL DAILY
Qty: 91 TABLET | Refills: 3 | Status: SHIPPED | OUTPATIENT
Start: 2025-01-23 | End: 2026-01-23

## 2025-01-23 RX ORDER — METRONIDAZOLE 500 MG/1
500 TABLET ORAL EVERY 12 HOURS SCHEDULED
Qty: 14 TABLET | Refills: 0 | Status: SHIPPED | OUTPATIENT
Start: 2025-01-23 | End: 2025-01-30

## 2025-01-23 RX ORDER — METRONIDAZOLE 7.5 MG/G
1 GEL VAGINAL WEEKLY
Qty: 25 G | Refills: 0 | Status: SHIPPED | OUTPATIENT
Start: 2025-01-23 | End: 2025-07-22

## 2025-01-28 LAB
LAB AP GYN PRIMARY INTERPRETATION: NORMAL
Lab: NORMAL

## 2025-02-11 ENCOUNTER — NURSE TRIAGE (OUTPATIENT)
Age: 35
End: 2025-02-11

## 2025-02-11 ENCOUNTER — TELEPHONE (OUTPATIENT)
Age: 35
End: 2025-02-11

## 2025-02-11 NOTE — TELEPHONE ENCOUNTER
"Patient began weekly dosing of metrogel treatment. States she began with thick white clumpy discharge. Notices more a few days after taking medication. Denies itching, irritation, redness, foul odor. Informed patient because she has no other vaginal symptoms, it sounds like discharge she is having is the metrogel medication itself as it expels from vagina. Informed will send msg to provider for review and she should call back if she begins with vaginitis symptoms. Patient verbalized understanding. Patient also asking for pap smear results to be interpreted.     Routing to provider for review.    Answer Assessment - Initial Assessment Questions  1. SYMPTOM: \"What's the main symptom you're concerned about?\" (e.g., pain, itching, dryness)      White clumpy discharge  3. ONSET: \"When did the  discharge  start?\"      After beginning metrogel  4. PAIN: \"Is there any pain?\" If Yes, ask: \"How bad is it?\" (Scale: 1-10; mild, moderate, severe)      Denies  5. ITCHING: \"Is there any itching?\" If Yes, ask: \"How bad is it?\" (Scale: 1-10; mild, moderate, severe)      Denies  6. CAUSE: \"What do you think is causing the discharge?\" \"Have you had the same problem before?\" \"What happened then?\"      Patient states unsure if yeast infection  7. OTHER SYMPTOMS: \"Do you have any other symptoms?\" (e.g., fever, itching, vaginal bleeding, pain with urination, injury to genital area, vaginal foreign body)      Denies foul odor, vaginal irritation, redness. States no other symptoms.    Protocols used: Vaginal Symptoms-Adult-[place]    "

## 2025-02-18 ENCOUNTER — TELEPHONE (OUTPATIENT)
Dept: OBGYN CLINIC | Facility: MEDICAL CENTER | Age: 35
End: 2025-02-18

## 2025-02-18 NOTE — TELEPHONE ENCOUNTER
Pt is aware of normal pap results,pt is also scheduled for nexplanon removal and possible insertion ,has not made up mind about reinsertion.

## 2025-02-18 NOTE — TELEPHONE ENCOUNTER
----- Message from Alida Lockhart MD sent at 2/18/2025  8:15 AM EST -----  Please tell patient pap is normal  ----- Message -----  From: Boy Casey MA  Sent: 2/17/2025   8:52 AM EST  To: Alida Lockhart MD    Pt said no one reviewed her pap from January with her.

## 2025-03-05 ENCOUNTER — PROCEDURE VISIT (OUTPATIENT)
Dept: OBGYN CLINIC | Facility: MEDICAL CENTER | Age: 35
End: 2025-03-05
Payer: COMMERCIAL

## 2025-03-05 VITALS
BODY MASS INDEX: 36.27 KG/M2 | WEIGHT: 204.7 LBS | HEIGHT: 63 IN | DIASTOLIC BLOOD PRESSURE: 72 MMHG | SYSTOLIC BLOOD PRESSURE: 115 MMHG

## 2025-03-05 DIAGNOSIS — N76.0 RECURRENT VAGINITIS: ICD-10-CM

## 2025-03-05 DIAGNOSIS — Z30.46 FAMILY PLANNING, SUBDERMAL CONTRACEPTIVE CHECKING/REINSERTION/REMOVAL: Primary | ICD-10-CM

## 2025-03-05 PROCEDURE — 11983 REMOVE/INSERT DRUG IMPLANT: CPT | Performed by: OBSTETRICS & GYNECOLOGY

## 2025-03-05 RX ORDER — METRONIDAZOLE 7.5 MG/G
1 GEL VAGINAL WEEKLY
Qty: 210 G | Refills: 2 | Status: SHIPPED | OUTPATIENT
Start: 2025-03-05 | End: 2025-06-03

## 2025-03-05 RX ORDER — NORTRIPTYLINE HYDROCHLORIDE 10 MG/1
10 CAPSULE ORAL
COMMUNITY
Start: 2024-08-21

## 2025-03-05 RX ORDER — METHOCARBAMOL 500 MG/1
500 TABLET, FILM COATED ORAL 4 TIMES DAILY PRN
COMMUNITY
Start: 2024-08-19

## 2025-03-05 NOTE — PROGRESS NOTES
"Universal Protocol:  Consent: Written consent obtained.  Risks and benefits: risks, benefits and alternatives were discussed  Consent given by: patient  Time out: Immediately prior to procedure a \"time out\" was called to verify the correct patient, procedure, equipment, support staff and site/side marked as required.  Patient understanding: patient states understanding of the procedure being performed  Patient consent: the patient's understanding of the procedure matches consent given  Procedure consent: procedure consent matches procedure scheduled  Relevant documents: relevant documents present and verified  Patient identity confirmed: verbally with patient  Remove and insert drug implant    Date/Time: 3/5/2025 11:44 PM    Performed by: Sravani Diaz MD  Authorized by: Sravani Diaz MD    Indication:     Indication: Presence of non-biodegradable drug delivery implant    Pre-procedure:     Pre-procedure timeout performed: yes      Local anesthetic:  Lidocaine 1%    The site was cleaned and prepped in a sterile fashion: yes    Procedure:     Procedure:  Removal    Small stab incision was made in arm: yes      Left/right:  Left  Comments:      Once assured of adequate analgesia, 2 mm skin incision was made with a knife.  The Nexplanon was grasped with mosquito clamp and removed in its entirety.  Universal Protocol:  Consent: Written consent obtained.  Risks and benefits: risks, benefits and alternatives were discussed  Consent given by: patient  Time out: Immediately prior to procedure a \"time out\" was called to verify the correct patient, procedure, equipment, support staff and site/side marked as required.  Patient understanding: patient states understanding of the procedure being performed  Patient consent: the patient's understanding of the procedure matches consent given  Procedure consent: procedure consent matches procedure scheduled  Relevant documents: relevant documents present and verified  Patient identity " confirmed: verbally with patient  Remove and insert drug implant    Date/Time: 3/5/2025 11:44 AM    Performed by: Sravani Diaz MD  Authorized by: Sravani Diaz MD    Indication:     Indication: Insertion of non-biodegradable drug delivery implant    Pre-procedure:     Pre-procedure timeout performed: yes      Prepped with: povidone-iodine      Local anesthetic:  Lidocaine 1%    The site was cleaned and prepped in a sterile fashion: yes    Procedure:     Procedure:  Insertion    Left/right:  Left    Preloaded contraceptive capsule trocar was placed subdermally: yes      Contraceptive capsule was inserted and trocar removed: yes      Palpation confirms placement by provider and patient: yes      Site was closed with steri-strips and pressure bandage applied: yes    Comments:      Patient requesting refill of MetroGel.  She was affected to use it weekly due to history of recurrent bacterial vaginosis.

## 2025-03-05 NOTE — PATIENT INSTRUCTIONS
Patient Education     Etonogestrel (e toe guillermina linares)   Brand Names: US Nexplanon   Brand Names: Rajat Nexplanon   What is this drug used for?   It is used to prevent pregnancy.  What do I need to tell my doctor BEFORE I take this drug?   If you are allergic to this drug; any part of this drug; or any other drugs, foods, or substances. Tell your doctor about the allergy and what signs you had.  If you have any of these health problems: Liver disease or liver tumors.  If you have ever had any of these health problems: Blood clots, breast cancer or other kind of cancer where hormones make it grow, heart attack, or stroke.  If you have unexplained vaginal bleeding.  If you have given birth within the last 21 days.  If you are pregnant or may be pregnant. Do not take this drug if you are pregnant.  If you have not started your period.  This is not a list of all drugs or health problems that interact with this drug.  Tell your doctor and pharmacist about all of your drugs (prescription or OTC, natural products, vitamins) and health problems. You must check to make sure that it is safe for you to take this drug with all of your drugs and health problems. Do not start, stop, or change the dose of any drug without checking with your doctor.  What are some things I need to know or do while I take this drug?   Tell all of your health care providers that you take this drug. This includes your doctors, nurses, pharmacists, and dentists.  After this drug has been put in, you may need to use a non-hormone birth control like condoms for some time. Talk with your doctor.  Breaking or bending of the implant may happen from things like contact sports or pushing on the area where it was placed. If the implant breaks, it can move from the original spot. If you cannot feel the implant or if you think that it has broken, bent, or moved while in your arm, talk with your doctor.  Problems have happened when this drug was put in or taken  out. These include pain; burning, numbness, or tingling; dizziness; passing out; bruising or bleeding; scars; or infection. If you have questions, talk with the doctor.  If you get pregnant while taking this drug, the chance of pregnancy outside of the uterus (ectopic pregnancy) may be raised. Talk with your doctor.  Blood clots have happened with this drug. Sometimes, these blood clots have been deadly. Talk with the doctor.  Talk with your doctor if you will need to be still for long periods of time like long trips, bedrest after surgery, or illness. Not moving for long periods may raise your chance of blood clots.  Call your doctor right away if you have signs of a blood clot like chest pain or pressure; coughing up blood; shortness of breath; swelling, warmth, numbness, change of color, or pain in a leg or arm; or trouble speaking or swallowing.  A cyst on the ovary may rarely happen.  If you have high blood sugar (diabetes), talk with your doctor. This drug may raise blood sugar.  Check your blood sugar as you have been told by your doctor.  Certain drugs, herbal products, or health problems may cause hormone-based birth control to not work as well. Be sure your doctor knows about all of your drugs and health problems. You will need to see if you also need to use a non-hormone form of birth control like condoms.  This drug does not stop the spread of diseases like HIV or hepatitis that are passed through having sex. Do not have any kind of sex without using a latex or polyurethane condom. If you have questions, talk with your doctor.  The chance of getting cervical cancer may be higher in people who take hormone-based birth control. However, this may be due to other reasons. If you have questions, talk with the doctor.  Some studies have shown the risk of breast cancer is raised when taking hormone-based birth control for a long time. However, other studies have not shown this risk. If you have questions, talk  with the doctor.  If this drug is removed and you do not want to get pregnant, use birth control right after it is removed. Pregnancy has happened shortly after this drug was removed. Talk with the doctor.  A pregnancy test will be done to show that you are NOT pregnant before starting this drug. If you get pregnant while taking this drug, call your doctor right away.  Tell your doctor if you are breast-feeding. You will need to talk about any risks to your baby.  What are some side effects that I need to call my doctor about right away?   WARNING/CAUTION: Even though it may be rare, some people may have very bad and sometimes deadly side effects when taking a drug. Tell your doctor or get medical help right away if you have any of the following signs or symptoms that may be related to a very bad side effect:  Signs of an allergic reaction, like rash; hives; itching; red, swollen, blistered, or peeling skin with or without fever; wheezing; tightness in the chest or throat; trouble breathing, swallowing, or talking; unusual hoarseness; or swelling of the mouth, face, lips, tongue, or throat.  Signs of liver problems like dark urine, tiredness, decreased appetite, upset stomach or stomach pain, light-colored stools, throwing up, or yellow skin or eyes.  Signs of high blood pressure like very bad headache or dizziness, passing out, or change in eyesight.  Signs of gallbladder problems like pain in the upper right belly area, right shoulder area, or between the shoulder blades; yellow skin or eyes; fever with chills; bloating; or very upset stomach or throwing up.  Signs of skin infection like oozing, heat, swelling, redness, or pain.  Weakness on 1 side of the body, trouble speaking or thinking, change in balance, drooping on one side of the face, or blurred eyesight.  Depression or other mood changes.  Eyesight changes or loss, bulging eyes, or change in how contact lenses feel.  A lump in the breast, breast pain or  soreness, or nipple discharge.  Flu-like signs.  Vaginal bleeding that is not normal.  This drug may cause you to swell or keep fluid in your body. Tell your doctor if you have swelling, weight gain, or trouble breathing.  What are some other side effects of this drug?   All drugs may cause side effects. However, many people have no side effects or only have minor side effects. Call your doctor or get medical help if any of these side effects or any other side effects bother you or do not go away:  Weight gain.  Dizziness or headache.  Pimples (acne).  Vaginal irritation.  Period (menstrual) changes. These include spotting between cycles or very light periods.  Irritation where mirian was placed.  Stomach pain.  Throat irritation.  Back pain.  Upset stomach.  Feeling nervous and excitable.  These are not all of the side effects that may occur. If you have questions about side effects, call your doctor. Call your doctor for medical advice about side effects.  You may report side effects to your national health agency.  You may report side effects to the FDA at 1-604.958.5564. You may also report side effects at https://www.fda.gov/medwatch.  How is this drug best taken?   Use this drug as ordered by your doctor. Read all information given to you. Follow all instructions closely.  A mirian is placed under the skin in the upper arm. This is a minor surgery. The mirian must be changed every 3 years.  Have blood work checked as you have been told by the doctor. Talk with the doctor.  If you drink grapefruit juice or eat grapefruit often, talk with your doctor.  Be sure to have regular breast exams and gynecology check-ups. You will also need to do breast self-exams as you have been told.  High blood pressure has happened with this drug. Have your blood pressure checked as you have been told by your doctor.  This drug may affect certain lab tests. Tell all of your health care providers and lab workers that you take this  drug.  What do I do if I miss a dose?   Call your doctor to find out what to do.  How do I store and/or throw out this drug?   If you need to store this drug at home, talk with your doctor, nurse, or pharmacist about how to store it.  General drug facts   If your symptoms or health problems do not get better or if they become worse, call your doctor.  Do not share your drugs with others and do not take anyone else's drugs.  Keep all drugs in a safe place. Keep all drugs out of the reach of children and pets.  Throw away unused or  drugs. Do not flush down a toilet or pour down a drain unless you are told to do so. Check with your pharmacist if you have questions about the best way to throw out drugs. There may be drug take-back programs in your area.  Some drugs may have another patient information leaflet. If you have any questions about this drug, please talk with your doctor, nurse, pharmacist, or other health care provider.  Some drugs may have another patient information leaflet. Check with your pharmacist. If you have any questions about this drug, please talk with your doctor, nurse, pharmacist, or other health care provider.  If you think there has been an overdose, call your poison control center or get medical care right away. Be ready to tell or show what was taken, how much, and when it happened.  Consumer Information Use and Disclaimer   This generalized information is a limited summary of diagnosis, treatment, and/or medication information. It is not meant to be comprehensive and should be used as a tool to help the user understand and/or assess potential diagnostic and treatment options. It does NOT include all information about conditions, treatments, medications, side effects, or risks that may apply to a specific patient. It is not intended to be medical advice or a substitute for the medical advice, diagnosis, or treatment of a health care provider based on the health care provider's  examination and assessment of a patient's specific and unique circumstances. Patients must speak with a health care provider for complete information about their health, medical questions, and treatment options, including any risks or benefits regarding use of medications. This information does not endorse any treatments or medications as safe, effective, or approved for treating a specific patient. [employer] and its affiliates disclaim any warranty or liability relating to this information or the use thereof. The use of this information is governed by the Terms of Use, available at https://www.[employer].com/en/know/clinical-effectiveness-terms.  Last Reviewed Date   2023-11-07  Copyright   © 2024 [employer] and its affiliates and/or licensors. All rights reserved.

## 2025-04-25 DIAGNOSIS — Z97.5 BREAKTHROUGH BLEEDING ON NEXPLANON: ICD-10-CM

## 2025-04-25 DIAGNOSIS — N76.0 RECURRENT VAGINITIS: ICD-10-CM

## 2025-04-25 DIAGNOSIS — N92.1 BREAKTHROUGH BLEEDING ON NEXPLANON: ICD-10-CM

## 2025-04-25 RX ORDER — LEVONORGESTREL AND ETHINYL ESTRADIOL 0.15-0.03
1 KIT ORAL DAILY
Qty: 91 TABLET | Refills: 3 | OUTPATIENT
Start: 2025-04-25 | End: 2026-04-25

## 2025-04-25 RX ORDER — METRONIDAZOLE 7.5 MG/G
1 GEL VAGINAL WEEKLY
Qty: 210 G | Refills: 2 | OUTPATIENT
Start: 2025-04-25 | End: 2025-07-24

## 2025-04-25 NOTE — TELEPHONE ENCOUNTER
Medication: levonorgestrel-ethinyl estradiol (Jolessa) 0.15-0.03 MG per tablet    Dose/Frequency: 1 tablet, Oral, Daily     Quantity: 91 tablet     Medication: metroNIDAZOLE (METROGEL) 0.75 % vaginal gel    Dose/Frequency:  1 Application, Vaginal, Weekly     Quantity: 210 g     Pharmacy:  [email or web address] - [facility] - [address] [place] [address]    Office:   [] PCP/Provider -   [x] Speciality/Provider -     Does the patient have enough for 3 days?   [x] Yes   [] No - Send as HP to POD

## (undated) DEVICE — LLETZ LOOP 20 X 12MM MEGADYNE

## (undated) DEVICE — STERILE 8 INCH PROCTO SWAB: Brand: CARDINAL HEALTH

## (undated) DEVICE — GLOVE PI ULTRA TOUCH SZ.6.5

## (undated) DEVICE — PENCIL ELECTROSURG E-Z CLEAN -0035H

## (undated) DEVICE — PREMIUM DRY TRAY LF: Brand: MEDLINE INDUSTRIES, INC.

## (undated) DEVICE — SCD SEQUENTIAL COMPRESSION COMFORT SLEEVE MEDIUM KNEE LENGTH: Brand: KENDALL SCD

## (undated) DEVICE — ELECTRODE BALL E-Z CLEAN 5 IN -0009

## (undated) DEVICE — NEEDLE SPINAL 22G X 3.5IN  QUINCKE

## (undated) DEVICE — SUT SILK 2-0 SH 30 IN K833H

## (undated) DEVICE — TUBING SUCTION 5MM X 12 FT

## (undated) DEVICE — SMOKE EVACUATION TUBING WITH 7/8 IN TO 1/4 IN REDUCER: Brand: BUFFALO FILTER

## (undated) DEVICE — BETHLEHEM UNIVERSAL MINOR VAG: Brand: CARDINAL HEALTH

## (undated) DEVICE — PVC URETHRAL CATHETER: Brand: DOVER

## (undated) DEVICE — GLOVE INDICATOR PI UNDERGLOVE SZ 6.5 BLUE

## (undated) DEVICE — SYRINGE 10ML LL CONTROL TOP

## (undated) DEVICE — DRAPE EQUIPMENT RF WAND

## (undated) DEVICE — LLETZ LOOP 10 X 10MM MEGADYNE

## (undated) DEVICE — MEDI-VAC YANKAUER SUCTION HANDLE W/STRAIGHT TIP & CONTROL VENT: Brand: CARDINAL HEALTH